# Patient Record
Sex: MALE | Race: WHITE | NOT HISPANIC OR LATINO | Employment: FULL TIME | URBAN - METROPOLITAN AREA
[De-identification: names, ages, dates, MRNs, and addresses within clinical notes are randomized per-mention and may not be internally consistent; named-entity substitution may affect disease eponyms.]

---

## 2017-01-17 ENCOUNTER — COMMUNICATION - HEALTHEAST (OUTPATIENT)
Dept: INTERNAL MEDICINE | Facility: CLINIC | Age: 42
End: 2017-01-17

## 2017-02-02 ENCOUNTER — OFFICE VISIT - HEALTHEAST (OUTPATIENT)
Dept: INTERNAL MEDICINE | Facility: CLINIC | Age: 42
End: 2017-02-02

## 2017-02-02 DIAGNOSIS — K21.9 GERD (GASTROESOPHAGEAL REFLUX DISEASE): ICD-10-CM

## 2017-02-15 ENCOUNTER — COMMUNICATION - HEALTHEAST (OUTPATIENT)
Dept: INTERNAL MEDICINE | Facility: CLINIC | Age: 42
End: 2017-02-15

## 2017-12-26 ENCOUNTER — COMMUNICATION - HEALTHEAST (OUTPATIENT)
Dept: INTERNAL MEDICINE | Facility: CLINIC | Age: 42
End: 2017-12-26

## 2017-12-26 ENCOUNTER — OFFICE VISIT - HEALTHEAST (OUTPATIENT)
Dept: INTERNAL MEDICINE | Facility: CLINIC | Age: 42
End: 2017-12-26

## 2017-12-26 DIAGNOSIS — R06.81 WITNESSED APNEIC SPELLS: ICD-10-CM

## 2017-12-26 DIAGNOSIS — Z80.9 FH: CANCER: ICD-10-CM

## 2017-12-26 DIAGNOSIS — Z12.5 SCREENING FOR PROSTATE CANCER: ICD-10-CM

## 2017-12-26 DIAGNOSIS — R06.83 SNORING: ICD-10-CM

## 2017-12-26 DIAGNOSIS — R53.83 FATIGUE: ICD-10-CM

## 2017-12-26 DIAGNOSIS — Z80.42 FH: PROSTATE CANCER: ICD-10-CM

## 2017-12-26 DIAGNOSIS — M25.561 RIGHT KNEE PAIN: ICD-10-CM

## 2017-12-26 DIAGNOSIS — Z23 NEED FOR INFLUENZA VACCINATION: ICD-10-CM

## 2017-12-26 DIAGNOSIS — Z13.220 LIPID SCREENING: ICD-10-CM

## 2017-12-26 DIAGNOSIS — Z78.9 NONSMOKER: ICD-10-CM

## 2017-12-26 DIAGNOSIS — Z00.00 ROUTINE PHYSICAL EXAMINATION: ICD-10-CM

## 2017-12-26 LAB
CHOLEST SERPL-MCNC: 261 MG/DL
FASTING STATUS PATIENT QL REPORTED: YES
HDLC SERPL-MCNC: 47 MG/DL
LDLC SERPL CALC-MCNC: 137 MG/DL
PSA SERPL-MCNC: 0.5 NG/ML (ref 0–2.5)
TRIGL SERPL-MCNC: 384 MG/DL

## 2017-12-26 ASSESSMENT — MIFFLIN-ST. JEOR: SCORE: 2022.98

## 2018-02-15 ENCOUNTER — COMMUNICATION - HEALTHEAST (OUTPATIENT)
Dept: INTERNAL MEDICINE | Facility: CLINIC | Age: 43
End: 2018-02-15

## 2018-02-15 DIAGNOSIS — K21.9 GERD (GASTROESOPHAGEAL REFLUX DISEASE): ICD-10-CM

## 2018-05-16 ENCOUNTER — COMMUNICATION - HEALTHEAST (OUTPATIENT)
Dept: INTERNAL MEDICINE | Facility: CLINIC | Age: 43
End: 2018-05-16

## 2018-05-16 DIAGNOSIS — K21.9 GERD (GASTROESOPHAGEAL REFLUX DISEASE): ICD-10-CM

## 2018-06-07 ENCOUNTER — OFFICE VISIT - HEALTHEAST (OUTPATIENT)
Dept: INTERNAL MEDICINE | Facility: CLINIC | Age: 43
End: 2018-06-07

## 2018-06-07 DIAGNOSIS — R07.0 THROAT PAIN: ICD-10-CM

## 2018-06-20 ENCOUNTER — OFFICE VISIT - HEALTHEAST (OUTPATIENT)
Dept: OTOLARYNGOLOGY | Facility: CLINIC | Age: 43
End: 2018-06-20

## 2018-06-20 DIAGNOSIS — R07.0 THROAT PAIN: ICD-10-CM

## 2018-06-20 DIAGNOSIS — K21.9 GASTROESOPHAGEAL REFLUX DISEASE, ESOPHAGITIS PRESENCE NOT SPECIFIED: ICD-10-CM

## 2018-10-14 ENCOUNTER — COMMUNICATION - HEALTHEAST (OUTPATIENT)
Dept: INTERNAL MEDICINE | Facility: CLINIC | Age: 43
End: 2018-10-14

## 2018-11-16 ENCOUNTER — COMMUNICATION - HEALTHEAST (OUTPATIENT)
Dept: INTERNAL MEDICINE | Facility: CLINIC | Age: 43
End: 2018-11-16

## 2018-11-16 DIAGNOSIS — K21.9 GERD (GASTROESOPHAGEAL REFLUX DISEASE): ICD-10-CM

## 2018-12-03 ENCOUNTER — COMMUNICATION - HEALTHEAST (OUTPATIENT)
Dept: INTERNAL MEDICINE | Facility: CLINIC | Age: 43
End: 2018-12-03

## 2018-12-03 DIAGNOSIS — K21.9 GERD (GASTROESOPHAGEAL REFLUX DISEASE): ICD-10-CM

## 2019-03-02 ENCOUNTER — COMMUNICATION - HEALTHEAST (OUTPATIENT)
Dept: INTERNAL MEDICINE | Facility: CLINIC | Age: 44
End: 2019-03-02

## 2019-03-02 DIAGNOSIS — K21.9 GERD (GASTROESOPHAGEAL REFLUX DISEASE): ICD-10-CM

## 2019-05-28 ENCOUNTER — COMMUNICATION - HEALTHEAST (OUTPATIENT)
Dept: INTERNAL MEDICINE | Facility: CLINIC | Age: 44
End: 2019-05-28

## 2019-05-28 DIAGNOSIS — K21.9 GERD (GASTROESOPHAGEAL REFLUX DISEASE): ICD-10-CM

## 2019-08-30 ENCOUNTER — COMMUNICATION - HEALTHEAST (OUTPATIENT)
Dept: INTERNAL MEDICINE | Facility: CLINIC | Age: 44
End: 2019-08-30

## 2019-08-30 DIAGNOSIS — K21.9 GERD (GASTROESOPHAGEAL REFLUX DISEASE): ICD-10-CM

## 2019-10-06 ENCOUNTER — COMMUNICATION - HEALTHEAST (OUTPATIENT)
Dept: INTERNAL MEDICINE | Facility: CLINIC | Age: 44
End: 2019-10-06

## 2019-10-06 DIAGNOSIS — K21.9 GERD (GASTROESOPHAGEAL REFLUX DISEASE): ICD-10-CM

## 2019-11-24 ENCOUNTER — COMMUNICATION - HEALTHEAST (OUTPATIENT)
Dept: INTERNAL MEDICINE | Facility: CLINIC | Age: 44
End: 2019-11-24

## 2019-11-24 DIAGNOSIS — K21.9 GERD (GASTROESOPHAGEAL REFLUX DISEASE): ICD-10-CM

## 2019-12-02 ENCOUNTER — COMMUNICATION - HEALTHEAST (OUTPATIENT)
Dept: INTERNAL MEDICINE | Facility: CLINIC | Age: 44
End: 2019-12-02

## 2019-12-02 DIAGNOSIS — K21.9 GERD (GASTROESOPHAGEAL REFLUX DISEASE): ICD-10-CM

## 2019-12-11 ENCOUNTER — COMMUNICATION - HEALTHEAST (OUTPATIENT)
Dept: INTERNAL MEDICINE | Facility: CLINIC | Age: 44
End: 2019-12-11

## 2019-12-11 DIAGNOSIS — K21.9 GERD (GASTROESOPHAGEAL REFLUX DISEASE): ICD-10-CM

## 2020-01-20 ENCOUNTER — OFFICE VISIT - HEALTHEAST (OUTPATIENT)
Dept: INTERNAL MEDICINE | Facility: CLINIC | Age: 45
End: 2020-01-20

## 2020-01-20 ENCOUNTER — COMMUNICATION - HEALTHEAST (OUTPATIENT)
Dept: INTERNAL MEDICINE | Facility: CLINIC | Age: 45
End: 2020-01-20

## 2020-01-20 DIAGNOSIS — K21.9 GASTROESOPHAGEAL REFLUX DISEASE, ESOPHAGITIS PRESENCE NOT SPECIFIED: ICD-10-CM

## 2020-01-20 DIAGNOSIS — H54.40 BLINDNESS OF LEFT EYE WITH NORMAL VISION IN CONTRALATERAL EYE: ICD-10-CM

## 2020-01-20 DIAGNOSIS — R06.81 APNEIC SPELL: ICD-10-CM

## 2020-01-20 DIAGNOSIS — Z00.00 ROUTINE PHYSICAL EXAMINATION: ICD-10-CM

## 2020-01-20 DIAGNOSIS — R06.83 SNORING: ICD-10-CM

## 2020-01-20 DIAGNOSIS — E78.2 MIXED HYPERLIPIDEMIA: ICD-10-CM

## 2020-01-20 LAB
CHOLEST SERPL-MCNC: 277 MG/DL
ERYTHROCYTE [DISTWIDTH] IN BLOOD BY AUTOMATED COUNT: 11.7 % (ref 11–14.5)
FASTING STATUS PATIENT QL REPORTED: YES
HCT VFR BLD AUTO: 49.1 % (ref 40–54)
HDLC SERPL-MCNC: 57 MG/DL
HGB BLD-MCNC: 16.2 G/DL (ref 14–18)
LDLC SERPL CALC-MCNC: 205 MG/DL
MCH RBC QN AUTO: 28.1 PG (ref 27–34)
MCHC RBC AUTO-ENTMCNC: 33 G/DL (ref 32–36)
MCV RBC AUTO: 85 FL (ref 80–100)
PLATELET # BLD AUTO: 241 THOU/UL (ref 140–440)
PMV BLD AUTO: 7.9 FL (ref 7–10)
RBC # BLD AUTO: 5.78 MILL/UL (ref 4.4–6.2)
TRIGL SERPL-MCNC: 77 MG/DL
WBC: 5.8 THOU/UL (ref 4–11)

## 2020-01-20 ASSESSMENT — MIFFLIN-ST. JEOR: SCORE: 2046.45

## 2020-01-21 ENCOUNTER — COMMUNICATION - HEALTHEAST (OUTPATIENT)
Dept: INTERNAL MEDICINE | Facility: CLINIC | Age: 45
End: 2020-01-21

## 2020-01-21 LAB — TSH SERPL DL<=0.005 MIU/L-ACNC: 1.73 UIU/ML (ref 0.3–5)

## 2020-02-27 ENCOUNTER — COMMUNICATION - HEALTHEAST (OUTPATIENT)
Dept: SCHEDULING | Facility: CLINIC | Age: 45
End: 2020-02-27

## 2020-12-06 ENCOUNTER — COMMUNICATION - HEALTHEAST (OUTPATIENT)
Dept: INTERNAL MEDICINE | Facility: CLINIC | Age: 45
End: 2020-12-06

## 2020-12-13 ENCOUNTER — AMBULATORY - HEALTHEAST (OUTPATIENT)
Dept: NURSING | Facility: CLINIC | Age: 45
End: 2020-12-13

## 2020-12-14 ENCOUNTER — COMMUNICATION - HEALTHEAST (OUTPATIENT)
Dept: SCHEDULING | Facility: CLINIC | Age: 45
End: 2020-12-14

## 2021-01-05 ENCOUNTER — OFFICE VISIT - HEALTHEAST (OUTPATIENT)
Dept: INTERNAL MEDICINE | Facility: CLINIC | Age: 46
End: 2021-01-05

## 2021-01-05 ENCOUNTER — COMMUNICATION - HEALTHEAST (OUTPATIENT)
Dept: INTERNAL MEDICINE | Facility: CLINIC | Age: 46
End: 2021-01-05

## 2021-01-05 DIAGNOSIS — R06.83 SNORING: ICD-10-CM

## 2021-01-05 DIAGNOSIS — K21.9 GASTROESOPHAGEAL REFLUX DISEASE, UNSPECIFIED WHETHER ESOPHAGITIS PRESENT: ICD-10-CM

## 2021-01-05 DIAGNOSIS — R06.81 APNEIC SPELL: ICD-10-CM

## 2021-01-20 ENCOUNTER — COMMUNICATION - HEALTHEAST (OUTPATIENT)
Dept: ADMINISTRATIVE | Facility: CLINIC | Age: 46
End: 2021-01-20

## 2021-01-20 DIAGNOSIS — Z84.81 FH: BRCA GENE POSITIVE: ICD-10-CM

## 2021-01-20 DIAGNOSIS — Z80.3 FH: BREAST CANCER: ICD-10-CM

## 2021-01-25 ENCOUNTER — RECORDS - HEALTHEAST (OUTPATIENT)
Dept: ADMINISTRATIVE | Facility: OTHER | Age: 46
End: 2021-01-25

## 2021-01-26 ENCOUNTER — RECORDS - HEALTHEAST (OUTPATIENT)
Dept: ADMINISTRATIVE | Facility: OTHER | Age: 46
End: 2021-01-26

## 2021-02-15 ENCOUNTER — RECORDS - HEALTHEAST (OUTPATIENT)
Dept: ADMINISTRATIVE | Facility: OTHER | Age: 46
End: 2021-02-15

## 2021-03-11 ENCOUNTER — RECORDS - HEALTHEAST (OUTPATIENT)
Dept: ADMINISTRATIVE | Facility: OTHER | Age: 46
End: 2021-03-11

## 2021-03-25 ENCOUNTER — RECORDS - HEALTHEAST (OUTPATIENT)
Dept: ADMINISTRATIVE | Facility: OTHER | Age: 46
End: 2021-03-25

## 2021-04-12 ENCOUNTER — RECORDS - HEALTHEAST (OUTPATIENT)
Dept: ADMINISTRATIVE | Facility: OTHER | Age: 46
End: 2021-04-12

## 2021-05-30 VITALS — WEIGHT: 224 LBS

## 2021-05-31 VITALS — HEIGHT: 77 IN | BODY MASS INDEX: 26.44 KG/M2 | WEIGHT: 223.9 LBS

## 2021-06-01 VITALS — WEIGHT: 226.6 LBS | BODY MASS INDEX: 26.87 KG/M2

## 2021-06-03 ENCOUNTER — RECORDS - HEALTHEAST (OUTPATIENT)
Dept: ADMINISTRATIVE | Facility: CLINIC | Age: 46
End: 2021-06-03

## 2021-06-04 VITALS
HEART RATE: 81 BPM | OXYGEN SATURATION: 96 % | BODY MASS INDEX: 26.94 KG/M2 | SYSTOLIC BLOOD PRESSURE: 112 MMHG | HEIGHT: 77 IN | DIASTOLIC BLOOD PRESSURE: 70 MMHG | WEIGHT: 228.2 LBS

## 2021-06-04 NOTE — TELEPHONE ENCOUNTER
Last Office Visit  12/26/2017 Sterling Drummond MD  Notes:  Patient comes in today for physical and for other issues.     He has been having increasing problems with apneic spells.  He does snore quite a bit and quite loudly.  His wife would like him to go through sleep evaluation.  He does have a little bit of issues with fatigue.     We reviewed his reflux and pantoprazole use.  This is going well for him.       Last Filled: 8/30/19  Next OV:  1/20/2020 Stanislaw Luo MD    Annual Physical on 1/20/20, and would like to request medication refills until he sees his PCP    Medication teed up for provider signature

## 2021-06-04 NOTE — TELEPHONE ENCOUNTER
Patient was not able to get his medication refills until he called to schedule an appointment with his PCP, Dr Luo.  Patient is now scheduled for his Annual Physical on 1/20/20, and would like to request medication refills until he sees his PCP. Angelina VOSSR

## 2021-06-05 NOTE — PATIENT INSTRUCTIONS - HE
Purpose:  The purpose of the GERD diet is to relieve many of the symptoms of GERD including heartburn, coughing, difficulty swallowing, chest pain, excessive clearing of the throat, the feeling that food is stuck in your throat and a sour taste or burning sensation in the mouth. Avoiding digestive stress can often alleviate some of the more uncomfortable or painfulaspects of GERD.    Factors That May Contribute to GERD:    Pregnancy    Being Overweight    Alcohol Use    Smoking    Medications that delay stomach emptying or increase the backup of acid into the esophagus    Tips for Avoiding GERD Pain:    Avoid using tobacco products as they can further weaken the esophagus    Avoid alcohol    Avoid overeating and eating too quickly    Avoid lying down or going to bed immediately after eating - wait 3 hours before lying down    Elevate the head of the bed 6  to 8  by placing blocks under the bedposts or putting a foam wedge under the top part of the mattress to avoid reflux while sleeping    Eat a low fat diet    Maintain a healthy weight    Drink liquids between meals instead of with meals    Chew gum after meals to help neutralize stomach acid    Eat in a calm, relaxed place, sit down while you eat    Exercise at least 3-4 times each week    Wear loose fitting clothing    GERD & Diet:  Reactions to foods vary greatly between individuals. Removing some foods from the diet may improved GERD and what the on person can tolerate may not be the same as what other can tolerate. Try eliminating all of the following foods to see if symptoms improve. Then try adding them back in one by one to see if some or all are contributing to GERD.      Fatty or fried foods - oil, butter, mayonnaise, cream sauces, high fat meals, whole milk dairy products, salad dressing,  cream soups    Mint flavorings, peppermint, spearmint, peppermint oil, spearmint oil    Spicy foods such as chili and juarez    Chocolate    Citrus fruits and juices  (grapefruit, orange, lemon, lime)    Caffeinated beverages such as tea, coffee, soda    Desserts    Raw onion and garlic    Tomato-based foods such as spaghetti sauce, pizza, chili

## 2021-06-05 NOTE — PROGRESS NOTES
Wt Readings from Last 20 Encounters:   01/20/20 (!) 228 lb 3.2 oz (103.5 kg)   06/07/18 (!) 226 lb 9.6 oz (102.8 kg)   12/26/17 (!) 223 lb 14.4 oz (101.6 kg)   02/02/17 (!) 224 lb (101.6 kg)   05/09/16 220 lb 4.8 oz (99.9 kg)   01/08/16 (!) 226 lb (102.5 kg)   11/17/15 207 lb (93.9 kg)   02/27/15 221 lb 4.8 oz (100.4 kg)

## 2021-06-06 NOTE — TELEPHONE ENCOUNTER
This does not seem like influenza based on his symptoms, so tamiflu is unlikely to help him.    Let me know if there are other symptoms.    Stanislaw Luo MD  General Internal Medicine  [facility]  2/27/2020, 12:08 PM

## 2021-06-06 NOTE — TELEPHONE ENCOUNTER
Called to patient and relayed message. Patient stated understanding and will make an appointment if he feels he needs to be seen but will continue home care and try cold medicine.

## 2021-06-06 NOTE — TELEPHONE ENCOUNTER
Pt is calling in with cold symptoms he developed this morning. Pt reports cough started today, and throat is a little sore. Pt denies earache, difficulty breathing, fever, runny nose, or sneezing. Pt reports his whole family has had this for a while. Pt is not a high risk patient, but has a wedding this weekend and wants to know if he can get Tamiflu ordered.   Home care measures discussed, and per protocol pt should be able to care for this at home. Discussed use of humidified air, Neti pot, and increasing fluids. Pt would like something prescribed because he has a wedding this weekend.   Please call Angelo at [phone] if you think Tamiflu is indicated.     Provider please advise.    Del Mcfadden RN [facility] Triage/Medication Refill    Reason for Disposition    Cough with no complications    Protocols used: COUGH-A-OH

## 2021-06-13 NOTE — TELEPHONE ENCOUNTER
Medication Question or Clarification  Who is calling: Wife  What medication are you calling about (include dose and sig)?:   pantoprazole (PROTONIX) 40 MG tablet 150 tablet 3 1/20/2020 3/20/2021    Sig - Route: Take 1 tablet (40 mg total) by mouth 2 (two) times a day for 60 days, THEN 1 tablet (40 mg total) daily. - Oral    Sent to pharmacy as: pantoprazole 40 mg tablet,delayed release (PROTONIX)    E-Prescribing Status: Receipt confirmed by pharmacy (1/20/2020  9:33 AM CST)        Who prescribed the medication?: Stanislaw Luo MD    What is your question/concern?: This medication does not seem to be working any longer for the patient.  Asking if there is an alternative medication that he can trail now.  The j patient is making an appointment, as he is due.  Please advise.  [id number]  Okay to leave a detailed message?: Yes

## 2021-06-13 NOTE — TELEPHONE ENCOUNTER
He would need to be seen to make a determination of this.  This is already a strong medication.    He could take it twice a day again if needed.    He would need to be seen before January 22nd (current appointment) for this if he wishes to address further.     He could also see gastroenterology for this but we could determine this at the time of his visit.    Stanislaw Luo MD  General Internal Medicine  [facility]  12/14/2020, 4:55 PM

## 2021-06-14 NOTE — TELEPHONE ENCOUNTER
Pt Mother was just diagnosed with Breast Cancer she has the BRCA gene and pt has been advised that he and his daughters should be tested.    Patient would like to know how to move forward.  Does he need an appt with PCP or can he just have an order to go to the Breast clinic.    Please call Pt and advise how to proceed.

## 2021-06-14 NOTE — TELEPHONE ENCOUNTER
PA APPROVED:    Approval start date: 01/05/2021  Approval end date:  01/05/2022    Pharmacy has been notified of approval and will contact patient when medication is ready for pickup.

## 2021-06-14 NOTE — TELEPHONE ENCOUNTER
He should schedule an appointment with a genetic counselor and then get testing for BRCA mutation.    I placed a referral.    He would call [phone] to schedule an appointment.  This might be able to be done via virtual visit with subsequent blood testing.    Stanislaw Luo MD  General Internal Medicine  [facility]  1/20/2021, 2:02 PM

## 2021-06-14 NOTE — PATIENT INSTRUCTIONS - HE
"To schedule your visit with the sleep specialist, please call [facility] at [phone].    Dr. Cameron Serna  [facility]  [address], [address]  [place] [address]    ______________________________________________________________________     To schedule your consultation with the gastroenterologist, please contact [facility] at [phone].    There are multiple sites across the [facility].  The sites on the [place] side of [facility] include:    Critical access hospital  [address] [place] [address]    [facility]  [address]   [place], [place] [address]     [place] [facility]  [address], [address]  [place] [address]    [facility]  [address], [address]  [place] [address]      ______________________________________________________________________     We will cancel the January appointment and follow up with me sometime in the next 6 months.    ______________________________________________________________________      You are due or coming due for your next tetanus vaccine.  If you are on Medicare, please check to see if your supplemental insurance covers this vaccine, as Medicare traditionally does not pay for this.    Here is some more information related to tetanus and why we recommend this vaccine:    Patient education: Tetanus (The Basics)    Written by the doctors and editors at UpToDate    What is tetanus? -- Tetanus is a serious infection that causes muscle stiffness and spasms. It is sometimes called \"lockjaw\" because muscle spasms can clench the jaw shut.    Tetanus is caused by bacteria (germs) that live in the soil. They can get into the body through a cut or scrape. They can also get into the body if a person uses a needle to inject illegal drugs. Most people in the [place] are protected from these bacteria because they have gotten vaccines against them.    What are the symptoms of " "tetanus? -- The symptoms include:    ?Stiff jaw or neck muscles, which make it hard to move your jaw or neck normally  ?Strange-looking smile that does not go away when you try to relax your mouth  ?Tight, painful muscles that do not let go when you try to relax them  ?Trouble breathing, swallowing, or both  ?Feeling irritable or restless  ?Sweating even when you are not exercising or hot  ?Heartbeat that is faster than usual, or irregular heartbeat  ?Fever  ?Painful muscle spasms    People who are very sick with tetanus can have muscle spasms that force the body into a \"bridge\" position. They might have:    ?Clenched fists  ?Back arched off the floor or bed  ?Legs stretched out  ?Arms moving back and forth  ?Trouble breathing - They might even stop breathing during a muscle spasm.    Should I see a doctor or nurse? -- See your doctor or nurse right away if:    ?You get a puncture wound, for example from a nail that goes through your skin.  ?You get a cut, scrape, or other injury you cannot clean completely.  ?You have an injury that leaves something (like a nail or glass) inside your body.  ?An animal bites you.  ?You have diabetes, and get a sore on your foot, leg, or other place.  ?You have a stiff jaw or neck, other tight muscles you cannot relax, or painful muscle spasms.  ?You have trouble breathing or swallowing.    It is especially important to see a doctor or nurse if you get a puncture wound or animal bite and your last tetanus shot was 5 years ago or longer, or if you do not remember getting a tetanus shot.    Is there a test for tetanus? -- No. There is no simple test. But your doctor or nurse should be able to tell if you have it by learning about your symptoms and vaccine history, and by doing an exam. This infection is most likely in people who have had an injury and who have not had the tetanus vaccine at all or not had the right vaccine boosters.    Is tetanus dangerous? -- Yes. People with tetanus " need to go to the hospital, and some people even die from it. The muscle spasms can stop your breathing.    How is tetanus treated? -- Doctors treat tetanus in the hospital, sometimes in the intensive care unit (ICU). Treatments include:    ?Cleaning cuts or scrapes to remove skin and tissue that could have tetanus bacteria on it  ?Giving medicines to fight the infection  ?Giving a tetanus vaccine booster  ?Giving medicine and other treatments to reduce muscle spasms, breathing problems, pain, and other symptoms  ?Using a ventilator (breathing machine) if you have trouble breathing on your own  ?Using a feeding tube if you cannot eat or drink on your own  ?Having physical therapy to help muscles recover    Can tetanus be prevented? -- Yes. To reduce your chances of getting tetanus, do these things:    ?Get a tetanus vaccine. This teaches your body how to fight tetanus. Most children growing up in the [place] get this vaccine as part of their routine childhood vaccines.  ?Get regular tetanus booster shots. Adults should get tetanus booster shots every 10 years. For bad wounds, you will need to get a tetanus booster shot if you haven't had one in the last 5 years. If you have a bad wound and you haven't received all of your tetanus vaccines or you are not sure if you have, you will need a tetanus booster shot and another shot to fight any tetanus bacteria that got in the wound.  ?Wash cuts or scrapes with soap and water and use antibiotic ointment on them. See a doctor or nurse if you cannot get all the dirt out or cannot see all the way into the wound.  ?Do not inject illegal drugs, or at least use clean needles if you do inject drugs or anything else.           05-Oct-2017 15:43

## 2021-06-14 NOTE — TELEPHONE ENCOUNTER
Central PA team  [phone]  Pool: HE PA MED ([id number])          PA has been initiated.       PA form completed and faxed insurance via Cover My Meds     Key:   [id number]     Medication:  Pantoprazole Sodium 40MG dr tablets    Insurance:  [employer] [place]        Response will be received via fax and may take up to 5-10 business days depending on plan

## 2021-06-18 NOTE — PROGRESS NOTES
"HPI: This patient is a 41 yo who presents for evaluation of the throat at the request of Dr. Drummond.  A few weeks ago patient heard a \"pop\" in his throat when he was eating.  Patient denied choking or dysphagia.  He then had lingering pain around his throat.  He took Ibuprofen for a few days which helped, but once he stopped that the pain resumed.  He does have acid reflux for which he takes reflux medication. He also notes more breakthrough heartburn. Patient drinks 12 cups of coffee a day. Denies fevers, otalgia, weight loss, odynophagia, dysphagia, hemoptysis, and shortness of breath.     Past medical history, surgical history, social history, family history, medications, and allergies have been reviewed with the patient and are documented above.    Review of Systems: a 10-system review was performed. Pertinent positives are noted in the HPI and on a separate scanned document in the chart.    PHYSICAL EXAMINATION:  GEN: no acute distress, normocephalic  EYES: extraocular movements are intact, pupils are equal and round. Sclera clear.   EARS: auricles are normally formed. The external auditory canals are clear with minimal to no cerumen. Tympanic membranes are intact bilaterally with no signs of infection, effusion, retractions, or perforations.  NOSE: anterior nares are patent. There are no masses or lesions. The septum is non-obstructing.  OC/OP: clear, dentition is in good repair. The tongue and palate are fully mobile and symmetric. The floor of mouth, base of tongue, and tonsils are soft and symmetric. Cobblestoning of the posterior pharyngeal wall.  HP/L (scope): nasopharynx, base of tongue, vallecula, epiglottis, and pyriform sinuses are clear. The bilateral vocal folds are mobile and without lesion. There is interarytenoid edema and erythema.  NECK: soft and supple. No lymphadenopathy or masses. Airway is midline.  NEURO: CN VII symmetric and strong. alert and oriented. No nystagmus. Gait is " normal.  PULM: breathing comfortably on room air, normal chest expansion with respiration    MEDICAL DECISION-MAKING: This patient is a 41 yo with chronic laryngitis/globus sensation from acid reflux. Discussed diet and lifestyle changes, including weight loss, in addition to risks and benefits of H2 blocker vs PPI therapy.  We also discussed that the sensation of his throat popping was likely from a muscle spasm.  Recent CT of the neck was negative and there were no masses or lesions visible with the scope today.  Patient with good understanding and may return to clinic PRN. Patient seen in tandem with Jess Carpio PA-C.

## 2021-06-18 NOTE — PROGRESS NOTES
"[facility] Note  Patient Name: Angelo Lino  Patient Age: 42 y.o.  YOB: 1975  MRN: [id number]  ?  Date of Visit: 6/7/2018  Reason for Office Visit:   Chief Complaint   Patient presents with     Sore Throat     last week was on the outter isde. This morning he was drinking coffee and heard a pop in the middle of throat. He stated everytime he swallows his very painful     HPI: Angelo Lino 42 y.o. who presents to clinic for painful throat x 1 week with acute worsening today. Had mild sore throat last week, then this morning was eating fruit and heard a 'pop' in throat. Now every time he swallows he has pain. Never had this before. He is able to swallow, not getting food stuck. No horse voice. Tender around cricothyroid. \"I know something is not right\". No difficulty breathing. Does not feel like anything is lodged or lump in throat.     Review of Systems: As noted in HPI     Current Scheduled Meds:  Outpatient Encounter Prescriptions as of 6/7/2018   Medication Sig Dispense Refill     loratadine-pseudoephedrine (CLARITIN-D 24-HOUR)  mg per 24 hr tablet Take 1 tablet by mouth daily.       MULTIVITS-MINERALS/FA/LYCOPENE (ONE-A-DAY MEN'S MULTIVITAMIN ORAL) Take by mouth.       pantoprazole (PROTONIX) 40 MG tablet Take 1 tablet (40 mg total) by mouth daily. 90 tablet 1     CHLORPHENIR/PHENYLEPH/ASPIRIN (BOY-SELTZER PLUS COLD, PE, ORAL) Take by mouth.       No facility-administered encounter medications on file as of 6/7/2018.        Objective / Physical Examination:  /70 (Patient Site: Right Arm, Patient Position: Sitting, Cuff Size: Adult Regular)  Pulse 69  Temp 98.3  F (36.8  C) (Oral)   Wt (!) 226 lb 9.6 oz (102.8 kg)  BMI 26.87 kg/m2  Wt Readings from Last 3 Encounters:   06/07/18 (!) 226 lb 9.6 oz (102.8 kg)   12/26/17 (!) 223 lb 14.4 oz (101.6 kg)   02/02/17 (!) 224 lb (101.6 kg)     Body mass index is 26.87 kg/(m^2). (>25?)    General Appearance: Alert and " oriented in no acute distress  Throat: Lips and mucosa moist. pharynx without erythema or exudate  Neck: full ROM. No midline cervical tenderness. He does endorse tenderness around cricothryoid, no masses, no thyromegaly. trachea midline. No cervical adenopathy. .  Integumentary: Warm and dry.  Neuro: Alert and oriented, follows commands appropriately. Grossly normal.    Assessment / Plan / Medical Decision Making:      Encounter Diagnoses   Name Primary?     Throat pain Yes        1. Throat pain    Cricothyroid injury? We did an xray and it was normal other than some calcification of the thyroid cartilage.  He is able to swallow, no foreign bodies. No breathing difficulties. Recommend ibuprofen three times a day for the next 3 days and watch closely.   If not improving or symptoms worsen, may warrant a CT and or referral to ENT  He agrees with plan and will let us know    - XR Neck Soft Tissue; Future  - XR Neck Soft Tissue    Total time spent with patient was 15 minutes with >50% of time spent in face-to-face counseling regarding the above plan     Sterling Drummond MD  [facility]

## 2021-06-20 NOTE — LETTER
Letter by Stanislaw Luo MD at      Author: Stanislaw Luo MD Service: -- Author Type: --    Filed:  Encounter Date: 1/21/2020 Status: Signed         1/21/2020    Angelo Lino   [address] [facility] [address]         Dear Angelo,    It was good to see you in clinic.  I hope your questions were answered at the time of your visit.    The results of your tests from your visit were as follows:    Resulted Orders   HM2(CBC w/o Differential)   Result Value Ref Range    WBC 5.8 4.0 - 11.0 thou/uL    RBC 5.78 4.40 - 6.20 mill/uL    Hemoglobin 16.2 14.0 - 18.0 g/dL    Hematocrit 49.1 40.0 - 54.0 %    MCV 85 80 - 100 fL    MCH 28.1 27.0 - 34.0 pg    MCHC 33.0 32.0 - 36.0 g/dL    RDW 11.7 11.0 - 14.5 %    Platelets 241 140 - 440 thou/uL    MPV 7.9 7.0 - 10.0 fL   Lipid Cascade RANDOM   Result Value Ref Range    Cholesterol 277 (H) <=199 mg/dL    Triglycerides 77 <=149 mg/dL    HDL Cholesterol 57 >=40 mg/dL    LDL Calculated 205 (H) <=129 mg/dL    Patient Fasting > 8hrs? Yes    Thyroid Stimulating Hormone (TSH)   Result Value Ref Range    TSH 1.73 0.30 - 5.00 uIU/mL     Your blood counts are normal and you are not anemic.     Your thyroid tests are excellent.     Your cholesterol is higher than in the past.  However, you are not a high risk for heart attack or stroke.  Please work on keeping active and keeping your weight down at this time.  We will continue to assess this.    Please follow up again in 1 year, sooner if issues.    If you have any questions regarding these results, please feel free to contact me at [phone].  I wish you the best of health!        Sincerely,      Stanislaw Luo MD  General Internal Medicine  [facility]

## 2021-06-21 NOTE — LETTER
Letter by Stanislaw Luo MD at      Author: Stanislaw Luo MD Service: -- Author Type: --    Filed:  Encounter Date: 12/6/2020 Status: (Other)         Angelo Lino   [address] [facility] [address]         Dear Angelo,    I am sending you this letter to remind you that you are due for a follow up visit in January or February.     Please call to schedule this visit as soon as possible as our schedule fills up quickly.    If you already have an appointment scheduled with me for around this time, please ignore this notification.    I look forward to seeing you to review your overall health.         Sincerely,       Stanislaw Luo MD  General Internal Medicine  [facility]

## 2021-06-24 NOTE — TELEPHONE ENCOUNTER
Refill Approved    Rx renewed per Medication Renewal Policy. Medication was last renewed on 12/3/2018.   Last office visit: 6/7/2018 with Dr TACHO Drummond. .    Deonna Duarte, [facility] Triage/Med Refill 3/2/2019     Requested Prescriptions   Pending Prescriptions Disp Refills     pantoprazole (PROTONIX) 40 MG tablet [Pharmacy Med Name: PANTOPRAZOLE 40MG TABLETS] 90 tablet 0     Sig: TAKE 1 TABLET BY MOUTH DAILY    GI Medications Refill Protocol Passed - 3/2/2019  3:32 AM       Passed - PCP or prescribing provider visit in last 12 or next 3 months.    Last office visit with prescriber/PCP: 1/8/2016 Stanislaw Luo MD OR same dept: 6/7/2018 Sterling Drummond MD OR same specialty: 6/7/2018 Sterling Drummond MD  Last physical: 12/26/2017 Last MTM visit: Visit date not found   Next visit within 3 mo: Visit date not found  Next physical within 3 mo: Visit date not found  Prescriber OR PCP: Stanislaw Luo MD  Last diagnosis associated with med order: 1. GERD (gastroesophageal reflux disease)  - pantoprazole (PROTONIX) 40 MG tablet [Pharmacy Med Name: PANTOPRAZOLE 40MG TABLETS]; TAKE 1 TABLET BY MOUTH DAILY  Dispense: 90 tablet; Refill: 0    If protocol passes may refill for 12 months if within 3 months of last provider visit (or a total of 15 months).

## 2021-06-25 NOTE — PROGRESS NOTES
Progress Notes by Louie Artis at 2/2/2017  4:20 PM     Author: Louie Artis Service: -- Author Type: Nurse Practitioner    Filed: 2/20/2017  8:58 AM Encounter Date: 2/2/2017 Status: Signed    : [facility]    Date of Service: 2/2/2017  Primary Provider: Stanislaw Luo MD    Patient Care Team:  Stanislaw Luo MD as PCP - General (Internal Medicine)     ______________________________________________________________________     Patient's Pharmacy:    [id number] - [place], [place] [address] AT [address] [address]  Phone: [phone] Fax: [phone]     Patient's Insurance:    Payor: BLUE CROSS / Plan: BLUE CROSS COMP CARE / Product Type: PPO/POS/FFS /      ______________________________________________________________________     Angelo Lino is 41 y.o. male who comes in today for:    Chief Complaint   Patient presents with   ? Medication Refill     Was needing to be seenf or any further refills Per Dr. Luo.        Patient Active Problem List   Diagnosis   ? HLD (hyperlipidemia)   ? GERD (gastroesophageal reflux disease)   ? AR (allergic rhinitis)   ? Blindness of left eye     Current Outpatient Prescriptions   Medication Sig   ? CHLORPHENIR/PHENYLEPH/ASPIRIN (BOY-SELTZER PLUS COLD, PE, ORAL) Take by mouth.   ? loratadine-pseudoephedrine (CLARITIN-D 24-HOUR)  mg per 24 hr tablet Take 1 tablet by mouth daily.   ? MULTIVITS-MINERALS/FA/LYCOPENE (ONE-A-DAY MEN'S MULTIVITAMIN ORAL) Take by mouth.   ? pantoprazole (PROTONIX) 40 MG tablet Take 1 tablet (40 mg total) by mouth daily.     Past Medical History:   Diagnosis Date   ? AR (allergic rhinitis)    ? Blindness of left eye    ? GERD (gastroesophageal reflux disease)    ? HLD (hyperlipidemia)      No past surgical history on file.  No family history on file.  Social History     Social  History   ? Marital status:      Spouse name: N/A   ? Number of children: 1   ? Years of education: N/A     Occupational History   ? 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 Smoking status: Never Smoker   ? Smokeless tobacco: Never Used   ? Alcohol use No   ? Drug use: Not on file   ? Sexual activity: Not on file     Other Topics Concern   ? Not on file     Social History Narrative    , 1 daughter.     ______________________________________________________________________     History of present illness:  Angelo Lino is a 41 year-old male with a past medical history includes hyperlipidemia, GERD, allergic rhinitis, and blindness of the left eye.  He presents today in clinic for prescription renewal of his PPI Protonix.  He states that his esophageal reflux has been well controlled with only rare instances of symptoms in which she'll take an occasional Tums.  Otherwise, he has been healthy and has no other health concerns at this time.    On review of systems, the patient denies any headache, nasal or sinus congestion, chest pain, shortness of breath, abdominal pain, constipation or dysuria.  Positive for symptoms as noted in the HPI.    ______________________________________________________________________    Wt Readings from Last 3 Encounters:   02/02/17 (!) 224 lb (101.6 kg)   05/09/16 220 lb 4.8 oz (99.9 kg)   01/08/16 (!) 226 lb (102.5 kg)     BP Readings from Last 3 Encounters:   02/02/17 104/70   05/09/16 110/70   01/08/16 120/72     Visit Vitals   ? /70   ? Pulse 74   ? Wt (!) 224 lb (101.6 kg)        Physical Exam:  General Appearance: Alert, cooperative, no distress, appears stated age  Ears: Normal TM's and external ear canals, both ears  Nose: Nares normal, septum midline,mucosa normal, no drainage  Throat: Lips, mucosa, and tongue normal; teeth and gums normal; oropharynx is normal  Neck: Supple, symmetrical, trachea midline, no adenopathy;  thyroid: not enlarged,  symmetric, no tenderness/mass/nodules  Lungs: Clear to auscultation bilaterally, respirations unlabored  Heart: Regular rate and rhythm, S1 and S2 normal, no murmur, rub, or gallop  Abdomen: Soft, non-tender, bowel sounds active all four quadrants,  no masses, no organomegaly  Extremities: Extremities normal, atraumatic, no cyanosis or edema  Lymph nodes: Cervical, supraclavicular nodes normal  Neurologic: He is alert and oriented ×3. He has normal reflexes.   Psychiatric: He has a normal mood and affect.   ______________________________________________________________________    Assessment:    1. GERD (gastroesophageal reflux disease) - well controlled on pantoprazole.      ______________________________________________________________________        Plan:  1. Continue pantoprozole (Protonix) 40 mg by mouth daily - Rx sent to pharmacy with RF #11    Louie Artis, [facility]  Internal Medicine  [facility]     Return if symptoms worsen or fail to improve.

## 2021-06-26 NOTE — PROGRESS NOTES
Progress Notes by Stanislaw Luo MD at 12/26/2017  3:30 PM     Author: Stanislaw Luo MD Service: -- Author Type: Physician    Filed: 12/29/2017  7:45 AM Encounter Date: 12/26/2017 Status: Signed    : Stanislaw Luo MD (Physician)              [facility]    Comprehensive and complex medical care - Chronic disease management - Shared decision making - Care coordination - Compassionate care    Patient advocacy - Rational deprescribing - Minimally disruptive medicine - Ethical focus - Customized care    ______________________________________________________________________     Date of Service: 12/26/2017  Primary Provider: Stanislaw Luo MD    Patient Care Team:  Stanislaw Luo MD as PCP - General (Internal Medicine)     ______________________________________________________________________     Patient's Pharmacy:    [id number] - [place], [place] - [address] AT [facility] &  [address]  [address] [facility] [address]  Phone: [phone] Fax: [phone]     Patient's Contacts:  Name Home Phone Work Phone Mobile Phone Relationship Lglauren YUDAYRON MARTI [phone]   Spouse      Patient's Insurance:    Payor: BLUE CROSS / Plan: BLUE CROSS COMP CARE / Product Type: PPO/POS/FFS /       ______________________________________________________________________     Routine physical - male, age 27-49    Angelo Lino is a 42 y.o. male coming in today for a routine physical.  He does have other issues outside the physical to discuss as well.    Active Problem List:  Problem List as of 12/26/2017  Reviewed: 1/9/2016  9:58 PM by Stanislaw Luo MD       Medium    GERD (gastroesophageal reflux disease)       Low    HLD (hyperlipidemia)    AR (allergic rhinitis)    Blindness of left eye           Past Medical History:   Diagnosis Date   ? AR (allergic rhinitis)    ? Blindness of left eye    ? GERD (gastroesophageal reflux  disease)    ? HLD (hyperlipidemia)    ? Nonsmoker      No past surgical history on file.  Social History     Social History   ? Marital status:      Spouse name: N/A   ? Number of children: 1   ? Years of education: N/A     Occupational History   ? 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 Smoking status: Never Smoker   ? Smokeless tobacco: Never Used   ? Alcohol use No   ? Drug use: None   ? Sexual activity: Not Asked     Other Topics Concern   ? None     Social History Narrative    , 1 daughter.      Family History   Problem Relation Age of Onset   ? Multiple myeloma Father    ? Breast cancer Sister 45   ? No Medical Problems Mother      Family history is otherwise noncontributory.    Current Outpatient Prescriptions   Medication Sig   ? CHLORPHENIR/PHENYLEPH/ASPIRIN (BOY-SELTZER PLUS COLD, PE, ORAL) Take by mouth.   ? loratadine-pseudoephedrine (CLARITIN-D 24-HOUR)  mg per 24 hr tablet Take 1 tablet by mouth daily.   ? MULTIVITS-MINERALS/FA/LYCOPENE (ONE-A-DAY MEN'S MULTIVITAMIN ORAL) Take by mouth.   ? pantoprazole (PROTONIX) 40 MG tablet Take 1 tablet (40 mg total) by mouth daily.       Allergies: Review of patient's allergies indicates no known allergies.  Immunization History   Administered Date(s) Administered   ? DT (pediatric) 01/01/2002   ? Influenza, inj, historic,unspecified 12/13/2007   ? Influenza,seasonal quad, PF, 36+MOS 12/26/2017   ? Influenza,seasonal, Inj IIV3 12/13/2007, 09/18/2014   ? Td,adult,historic,unspecified 01/01/2002   ? Tdap 06/10/2010, 09/19/2011      ______________________________________________________________________     History of present illness:    Patient comes in today for physical and for other issues.    He has been having increasing problems with apneic spells.  He does snore quite a bit and quite loudly.  His wife would like him to go through sleep evaluation.  He does have a little bit of issues with fatigue.    We reviewed his reflux  "and pantoprazole use.  This is going well for him.    He is really concerned because of a family history of cancer.  His dad passed away this year of multiple myeloma at age 68.  His sister was diagnosed with breast cancer at age 45.  She is apparently undergoing extensive treatment.  She had testing for BRCA 1 and 2 which was negative.  He would like baseline PSA because his internal grandfather and maternal brother both had prostate cancer at an older age.  He has no other symptoms of cancer at this time.    He has had some right knee pain as well and this is on the inside of the knee along the MCL.  It is sporadic in appearance.  He has had no swelling with this.  We talked about x-ray, but he wants to wait on this at this time.    10 point review of systems are per the health history form.    ______________________________________________________________________     Exam:    Wt Readings from Last 3 Encounters:   12/26/17 (!) 223 lb 14.4 oz (101.6 kg)   02/02/17 (!) 224 lb (101.6 kg)   05/09/16 220 lb 4.8 oz (99.9 kg)     BP Readings from Last 3 Encounters:   12/26/17 126/82   02/02/17 104/70   05/09/16 110/70      /82  Pulse 88  Ht 6' 5\" (1.956 m)  Wt (!) 223 lb 14.4 oz (101.6 kg)  SpO2 95%  BMI 26.55 kg/m2    The patient is in no acute distress.  Mood good.  Insight good.  No skin lesions or nodules of concern.  Ears are clear.  Nose is clear.  Throat is clear.  Neck is supple  and there is no thyromegaly.  No carotid bruits.  No cervical, epitrochlear or axillary adenopathy.  Heart regular rate and rhythm.  No murmur present.  Lungs clear to auscultation bilaterally.  Respiratory effort is good.  Abdomen is soft, nontender.  No hepatosplenomegaly.  No hernia appreciated.  Extremities show no edema.  Neuro exam shows normal strength in all muscle groups and normal reflexes.  Testicular exam shows no abnormality.  Prostate is 2+ smooth and soft.  No rectal mass.  The right knee shows no effusion and " no meniscal signs.    ______________________________________________________________________    Diagnostics:    Results for orders placed or performed in visit on 12/26/17   PSA, Annual Screen (Prostatic-Specific Antigen)   Result Value Ref Range    PSA 0.5 0.0 - 2.5 ng/mL   Comprehensive Metabolic Panel   Result Value Ref Range    Sodium 141 136 - 145 mmol/L    Potassium 4.5 3.5 - 5.0 mmol/L    Chloride 104 98 - 107 mmol/L    CO2 27 22 - 31 mmol/L    Anion Gap, Calculation 10 5 - 18 mmol/L    Glucose 95 70 - 125 mg/dL    BUN 17 8 - 22 mg/dL    Creatinine 0.93 0.70 - 1.30 mg/dL    GFR MDRD Af Amer >60 >60 mL/min/1.73m2    GFR MDRD Non Af Amer >60 >60 mL/min/1.73m2    Bilirubin, Total 0.2 0.0 - 1.0 mg/dL    Calcium 9.5 8.5 - 10.5 mg/dL    Protein, Total 7.4 6.0 - 8.0 g/dL    Albumin 3.9 3.5 - 5.0 g/dL    Alkaline Phosphatase 68 45 - 120 U/L    AST 21 0 - 40 U/L    ALT 44 0 - 45 U/L   Lipid Cascade   Result Value Ref Range    Cholesterol 261 (H) <=199 mg/dL    Triglycerides 384 (H) <=149 mg/dL    HDL Cholesterol 47 >=40 mg/dL    LDL Calculated 137 (H) <=129 mg/dL    Patient Fasting > 8hrs? Yes    HM1 (CBC with Diff)   Result Value Ref Range    WBC 6.9 4.0 - 11.0 thou/uL    RBC 5.59 4.40 - 6.20 mill/uL    Hemoglobin 15.6 14.0 - 18.0 g/dL    Hematocrit 45.7 40.0 - 54.0 %    MCV 82 80 - 100 fL    MCH 28.0 27.0 - 34.0 pg    MCHC 34.2 32.0 - 36.0 g/dL    RDW 12.7 11.0 - 14.5 %    Platelets 206 140 - 440 thou/uL    MPV 8.1 7.0 - 10.0 fL    Neutrophils % 48 (L) 50 - 70 %    Lymphocytes % 35 20 - 40 %    Monocytes % 6 2 - 10 %    Eosinophils % 9 (H) 0 - 6 %    Basophils % 2 0 - 2 %    Neutrophils Absolute 3.3 2.0 - 7.7 thou/uL    Lymphocytes Absolute 2.4 0.8 - 4.4 thou/uL    Monocytes Absolute 0.4 0.0 - 0.9 thou/uL    Eosinophils Absolute 0.6 (H) 0.0 - 0.4 thou/uL    Basophils Absolute 0.1 0.0 - 0.2 thou/uL      ______________________________________________________________________    Assessment:    1. Routine physical  examination    2. Need for influenza vaccination    3. Witnessed apneic spells    4. Snoring    5. FH: prostate cancer    6. Screening for prostate cancer    7. Fatigue    8. Lipid screening    9. FH: cancer    10. Nonsmoker    11. Right knee pain         ______________________________________________________________________     ______________________________________________________________________    QUALITY REVIEW      There are no preventive care reminders to display for this patient.    History   Smoking Status   ? Never Smoker   Smokeless Tobacco   ? Never Used        PHQ-2 Total Score: 0 (12/26/2017  3:00 PM)  No Data Recorded     ______________________________________________________________________       Plan:    1. Blood work done today.  2. Continue pantoprazole at this time.  Next year come in for physical.  3. Sleep referral for sleep study.  4. Consider x-ray of the right knee if not improving.    Stanislaw Luo MD  General Internal Medicine  [facility]    Return in about 1 year (around 12/26/2018) for physical.     No future appointments.

## 2021-06-28 NOTE — PROGRESS NOTES
Progress Notes by Stanislaw Luo MD at 1/20/2020  8:25 AM     Author: Stanislaw Luo MD Service: -- Author Type: Physician    Filed: 1/20/2020  2:39 PM Encounter Date: 1/20/2020 Status: Signed    : Stanislaw Luo MD (Physician)              [facility] - [facility]    Comprehensive and complex medical care - Chronic disease management - Shared decision making - Care coordination - Compassionate care    Patient advocacy - Rational deprescribing - Minimally disruptive medicine - Ethical focus - Customized care          Date of Service: 1/20/2020  Primary Provider: Stanislaw Luo MD    Patient Care Team:  Stanislaw Luo MD as PCP - General (Internal Medicine)  Stanislaw Luo MD as Assigned PCP     ______________________________________________________________________     Patient's Pharmacy:    [id number] - [address] AT [facility] & [address]  [address] [place] [address]  Phone: [phone] Fax: [phone]     Patient's Contacts:  Name Home Phone Work Phone Mobile Phone Relationship Lgl DAYRON Davison [phone] Spouse      Patient's Insurance:    Payor: BLUE CROSS / Plan: BLUE CROSS [facility] / Product Type: PPO/POS/FFS /          Routine physical - male, age 27-49    Angelo Lino is a 44 y.o. male coming in today for a routine physical.  He does have other issues outside the physical to discuss as well.    Chief Complaint   Patient presents with   ? Annual Exam   ? Sleep Apnea     cpap        Active Problem List:  Problem List as of 1/20/2020 Reviewed: 12/29/2017  7:41 AM by Stanislaw Luo MD       High    Nonsmoker       Medium    GERD (gastroesophageal reflux disease)       Low    AR (allergic rhinitis)    Blindness of left eye    HLD (hyperlipidemia)           Past Medical History:   Diagnosis Date   ? AR (allergic rhinitis)    ? Blindness of left eye    ? GERD  (gastroesophageal reflux disease)    ? HLD (hyperlipidemia)    ? Nonsmoker      No past surgical history on file.  Social History     Occupational History   ? Occupation: [employer]     Employer: [employer]   Tobacco Use   ? Smoking status: Never Smoker   ? Smokeless tobacco: Never Used   Substance and Sexual Activity   ? Alcohol use: No   ? Drug use: Not on file   ? Sexual activity: Not on file      Social History     Social History Narrative    , 1 daughter.      Family History   Problem Relation Age of Onset   ? Multiple myeloma Father    ? Breast cancer Sister 45   ? No Medical Problems Mother      Family history is otherwise noncontributory.    Current Outpatient Medications   Medication Sig   ? CHLORPHENIR/PHENYLEPH/ASPIRIN (BOY-SELTZER PLUS COLD, PE, ORAL) Take by mouth.   ? loratadine-pseudoephedrine (CLARITIN-D 24-HOUR)  mg per 24 hr tablet Take 1 tablet by mouth daily.   ? MULTIVITS-MINERALS/FA/LYCOPENE (ONE-A-DAY MEN'S MULTIVITAMIN ORAL) Take by mouth.   ? pantoprazole (PROTONIX) 40 MG tablet Take 1 tablet (40 mg total) by mouth 2 (two) times a day for 60 days, THEN 1 tablet (40 mg total) daily.     Allergies: Patient has no known allergies.  Immunization History   Administered Date(s) Administered   ? DT (pediatric) 01/01/2002   ? Influenza, inj, historic,unspecified 12/13/2007, 11/01/2019   ? Influenza,seasonal quad, PF, =/> 6months 12/26/2017   ? Influenza,seasonal, Inj IIV3 12/13/2007, 09/18/2014   ? Td,adult,historic,unspecified 01/01/2002   ? Tdap 06/10/2010, 09/19/2011             Patient comes in today to be seen for a number of issues.  He is also in for physical.    We reviewed his acid reflux.  He has been off of pantoprazole for 1 month.  He has had worsening symptoms in the last year and a half.  He denies any recent episodes of catching in his throat.  He is had it more prominent in the last year to year and a half.  This has escalated since being off the pantoprazole.  He has  "been using Tums which helped.  Rolaids can also help.  He is used dietary modification without a whole lot of success.  He is wanting to get back on the pantoprazole.  He also discussed with me gastroenterology evaluation and possible other interventions, but he wants to hold on this at this time.    We reviewed his issues with his sleeping.  He has been snoring quite a bit and he does have apneic spells.  He has a morning headache.  He does note some fatigue during the day.  He would like to proceed with sleep evaluation.    We reviewed his hyperlipidemia and will be checking up on this.  He is not at a high risk for heart disease.    He declines HIV screening.    He does get some headaches at times likely related to clenching his teeth at night and possibly some migraines related to his left eye.    The 10-system review of systems and health history form for [facility] was completed by patient, reviewed by me, and pertinent problems are reviewed above.    Exam:     Wt Readings from Last 3 Encounters:   01/20/20 (!) 228 lb 3.2 oz (103.5 kg)   06/07/18 (!) 226 lb 9.6 oz (102.8 kg)   12/26/17 (!) 223 lb 14.4 oz (101.6 kg)     BP Readings from Last 3 Encounters:   01/20/20 112/70   06/07/18 110/70   12/26/17 126/82     /70   Pulse 81   Ht 6' 5.25\" (1.962 m)   Wt (!) 228 lb 3.2 oz (103.5 kg)   SpO2 96%   BMI 26.89 kg/m     The patient is in no acute distress.  Mood good.  Insight good.  No skin lesions or nodules of concern.  Ears are clear.  Left pupil is dilated due to visual loss.  Nose is clear.  Throat is clear.  Neck is supple  and there is no thyromegaly.  No carotid bruits.  No cervical, epitrochlear or axillary adenopathy.  Heart regular rate and rhythm.  No murmur present.  Lungs clear to auscultation bilaterally.  Respiratory effort is good.  Abdomen is soft, nontender.  No hepatosplenomegaly.  No hernia appreciated.  Extremities show no edema.  Neuro exam shows normal strength in all muscle " groups and normal reflexes.      Diagnostics:     Results for orders placed or performed in visit on 01/20/20   HM2(CBC w/o Differential)   Result Value Ref Range    WBC 5.8 4.0 - 11.0 thou/uL    RBC 5.78 4.40 - 6.20 mill/uL    Hemoglobin 16.2 14.0 - 18.0 g/dL    Hematocrit 49.1 40.0 - 54.0 %    MCV 85 80 - 100 fL    MCH 28.1 27.0 - 34.0 pg    MCHC 33.0 32.0 - 36.0 g/dL    RDW 11.7 11.0 - 14.5 %    Platelets 241 140 - 440 thou/uL    MPV 7.9 7.0 - 10.0 fL       Assessment:     1. Routine physical examination    2. Gastroesophageal reflux disease, esophagitis presence not specified    3. Mixed hyperlipidemia    4. Snoring    5. Apneic spell    6. Blindness of left eye with normal vision in contralateral eye        Quality review:     PHQ-2 Total Score: 0 (1/20/2020  8:39 AM)    No data recorded  ______________________________________________________________________     BMI Readings from Last 1 Encounters:   01/20/20 26.89 kg/m        Plan:     1. Blood work done today.  2. Pantoprazole 40 mg twice daily for 2 months, then reduce to once a day.  3. Given diet on GERD.  4. Consider GI referral in the future.  This could be done without seeing.  Would do consult rather than just straight EGD.  5. Referral to sleep medicine for evaluation of sleep apnea.    Stanislaw Luo MD  General Internal Medicine  [facility]    Return in about 1 year (around 1/20/2021).     No future appointments.      ______________________________________________________________________     Relevant ICD-10 codes and order associations:      ICD-10-CM    1. Routine physical examination Z00.00    2. Gastroesophageal reflux disease, esophagitis presence not specified K21.9 HM2(CBC w/o Differential)     pantoprazole (PROTONIX) 40 MG tablet     DISCONTINUED: pantoprazole (PROTONIX) 40 MG tablet   3. Mixed hyperlipidemia E78.2 Lipid Cascade RANDOM   4. Snoring R06.83 Ambulatory referral to Sleep Medicine   5. Apneic spell  R06.81 Ambulatory referral to Sleep Medicine   6. Blindness of left eye with normal vision in contralateral eye H54.40

## 2021-06-30 NOTE — PROGRESS NOTES
Progress Notes by Stanislaw Luo MD at 1/5/2021  8:15 AM     Author: Stanislaw Luo MD Service: -- Author Type: Physician    Filed: 1/5/2021 11:38 AM Encounter Date: 1/5/2021 Status: Signed    : Stanislaw Luo MD (Physician)       Angelo Lino is a 45 y.o. male who is being evaluated via a billable telephone visit.      What phone number would you like to be contacted at? [phone]  How would you like to obtain your AVS? AVS Preference: Mail a copy.           [facility]     TELEPHONE VISIT     Comprehensive and complex medical care - Chronic disease management - Shared decision making - Care coordination - Compassionate care    Patient advocacy - Rational deprescribing - Minimally disruptive medicine - Ethical focus - Customized care         Angelo Lino is a 45 y.o. male who is being evaluated via a billable telephone visit.           Active Problem List:  Problem List as of 1/5/2021 Reviewed: 1/5/2021 11:32 AM by Stanislaw Luo MD       High    Nonsmoker       Medium    GERD (gastroesophageal reflux disease)       Low    AR (allergic rhinitis)    Blindness of left eye    HLD (hyperlipidemia)           Current Outpatient Medications   Medication Sig   ? MULTIVITS-MINERALS/FA/LYCOPENE (ONE-A-DAY MEN'S MULTIVITAMIN ORAL) Take by mouth.   ? pantoprazole (PROTONIX) 40 MG tablet Take 1 tablet (40 mg total) by mouth 2 (two) times a day.   ? loratadine-pseudoephedrine (CLARITIN-D 24-HOUR)  mg per 24 hr tablet Take 1 tablet by mouth daily.       Subjective:     Angelo Lino presents with:      Chief Complaint   Patient presents with   ? Gastroesophageal Reflux     medication isn't helping much the past three months, only takes pantoprazole once a day most days      The patient has a phone visit today which is done in light of the current coronavirus outbreak.    Patient comes in today by phone to discuss his health.    We reviewed that he is due  for tetanus shot and he should get this at the next visit.    We reviewed his reflux disease.  He has dealt with this for a long time.  It sounds like he never doubled up on his dose last year when we advised him to do so.  It is been really horrible in the last 3 months.  He has tried to change his diet.  He has it daily.  He has acid in the back of his throat.  He denies any dysphagia.  He denies any blood in the stools.  His symptoms are predominantly in his chest and his throat and not so much in his abdomen.  He has never had an EGD.  He has not seen gastroenterology for this.  He would like to see them and we talked about other possible diagnoses that could be present.    We reviewed his apneic spells and snoring.  He has now gotten to the point where he wakes himself up.  His wife says it is very severe as well.  She has seen him stop breathing at night.  He was given a referral to sleep medicine last year but did not follow through.  We will renew this and he is ready to follow through.  He is hoping he can have a home sleep test, however.    We reviewed his other issues noted in the assessment but not specifically addressed in the HPI above.     Objective:     Limited phone exam reveals:  Patient in no distress.  Mood is good.  Insight is good.  No dyspnea.    Diagnostics:     Results for orders placed or performed in visit on 01/20/20   HM2(CBC w/o Differential)   Result Value Ref Range    WBC 5.8 4.0 - 11.0 thou/uL    RBC 5.78 4.40 - 6.20 mill/uL    Hemoglobin 16.2 14.0 - 18.0 g/dL    Hematocrit 49.1 40.0 - 54.0 %    MCV 85 80 - 100 fL    MCH 28.1 27.0 - 34.0 pg    MCHC 33.0 32.0 - 36.0 g/dL    RDW 11.7 11.0 - 14.5 %    Platelets 241 140 - 440 thou/uL    MPV 7.9 7.0 - 10.0 fL   Lipid Cascade RANDOM   Result Value Ref Range    Cholesterol 277 (H) <=199 mg/dL    Triglycerides 77 <=149 mg/dL    HDL Cholesterol 57 >=40 mg/dL    LDL Calculated 205 (H) <=129 mg/dL    Patient Fasting > 8hrs? Yes    Thyroid  Stimulating Hormone (TSH)   Result Value Ref Range    TSH 1.73 0.30 - 5.00 uIU/mL       Quality review:     PHQ-2 Total Score: 0 (1/5/2021  8:23 AM)    No data recorded  ______________________________________________________________________     BMI Readings from Last 1 Encounters:   01/20/20 26.89 kg/m        Assessment and Plan:     1. Apneic spell  Will refer to sleep medicine.  He would prefer to do a home sleep test if possible.  Pretest probability of obstructive sleep apnea (RENÉ) is very good.    - Ambulatory referral to Sleep Medicine - [place] [facility]    2. Snoring    - Ambulatory referral to Sleep Medicine - [place] [facility]    3. Gastroesophageal reflux disease, unspecified whether esophagitis present  Recalcitrant symptoms.  Will increase the pantoprazole (Protonix) to two times a day for the time being.  Discussed with patient and he would like opinion with gastroenterology and I also recommend this.  He will likely need upper endoscopy (EGD).    - pantoprazole (PROTONIX) 40 MG tablet; Take 1 tablet (40 mg total) by mouth 2 (two) times a day.  Dispense: 180 tablet; Refill: 3  - Ambulatory referral to Gastroenterology [facility]     Tetanus shot is due and he will do this at the next visit.  He will schedule a follow-up in the next 6 months.    Phone call duration:  19 minutes    Return in about 6 months (around 7/5/2021) for follow up visit.     No future appointments.         Stanislaw Luo MD  General Internal Medicine  [facility]    HCC issues resolved at this visit.

## 2021-07-03 NOTE — ADDENDUM NOTE
Addendum Note by Jesus Davidson MD at 1/20/2020  8:25 AM     Author: Jesus Davidson MD Service: -- Author Type: Physician    Filed: 1/20/2020 10:14 PM Encounter Date: 1/20/2020 Status: Signed    : Jesus Davidson MD (Physician)    Addended by: JESUS DAVIDSON on: 1/20/2020 10:14 PM        Modules accepted: Orders

## 2021-08-04 ENCOUNTER — TELEPHONE (OUTPATIENT)
Dept: INTERNAL MEDICINE | Facility: CLINIC | Age: 46
End: 2021-08-04

## 2021-08-04 DIAGNOSIS — M25.521 RIGHT ELBOW PAIN: Primary | ICD-10-CM

## 2021-08-04 NOTE — TELEPHONE ENCOUNTER
Please verify the issue and the elbow involved so that I can put it on the referral.    I recommend Dr. Mat Benavidez or Dr. Curtis Bhatt at [facility] for elbow issues.    To schedule an appointment with [facility], please call [phone].     Return to me to place referral.    Stanislaw Luo MD  General Internal Medicine  [facility]  8/4/2021, 10:15 AM

## 2021-08-04 NOTE — TELEPHONE ENCOUNTER
Last visit: 1/5/2021-Dr Stanislaw Luo    Patient requesting referral to ortho to have elbow looked at.    Pended order

## 2021-08-04 NOTE — TELEPHONE ENCOUNTER
Dr. Valerio Stephens is calling today asking for a referral to Orthopedics to get his elbow looked at. He can be reached at [phone] with any additional questions.

## 2021-08-04 NOTE — TELEPHONE ENCOUNTER
Called to patient. He states that it is his right elbow.    2 months ago on vacation, him and his kids were goofing around when this happened. He did not hear a pop but stated that something seems off feels as if the bone/ joint is busied, Unsure if its in the nerve. Has had tennis elbow before and knows this is not it.    Constantly sore. Times where it flares up when he cant even move it. Taking ibuprofen. Icing and both are not really effective.    Patient took down number as well as provider names and will give them a call

## 2021-08-05 ENCOUNTER — TRANSFERRED RECORDS (OUTPATIENT)
Dept: HEALTH INFORMATION MANAGEMENT | Facility: CLINIC | Age: 46
End: 2021-08-05

## 2021-11-06 ENCOUNTER — IMMUNIZATION (OUTPATIENT)
Dept: FAMILY MEDICINE | Facility: CLINIC | Age: 46
End: 2021-11-06
Payer: COMMERCIAL

## 2021-11-06 PROCEDURE — 90471 IMMUNIZATION ADMIN: CPT

## 2021-11-06 PROCEDURE — 90686 IIV4 VACC NO PRSV 0.5 ML IM: CPT

## 2022-01-04 DIAGNOSIS — K21.9 GASTROESOPHAGEAL REFLUX DISEASE, UNSPECIFIED WHETHER ESOPHAGITIS PRESENT: Primary | ICD-10-CM

## 2022-01-04 RX ORDER — PANTOPRAZOLE SODIUM 40 MG/1
40 TABLET, DELAYED RELEASE ORAL 2 TIMES DAILY
Qty: 120 TABLET | Refills: 0 | Status: SHIPPED | OUTPATIENT
Start: 2022-01-04 | End: 2022-04-14

## 2022-02-08 ENCOUNTER — TELEPHONE (OUTPATIENT)
Dept: INTERNAL MEDICINE | Facility: CLINIC | Age: 47
End: 2022-02-08
Payer: COMMERCIAL

## 2022-02-15 NOTE — TELEPHONE ENCOUNTER
Prior Authorization Approval    Authorization Effective Date: 2/8/2022  Authorization Expiration Date: 2/8/2023  Medication: pantoprazole   Approved Dose/Quantity: 2 tabs per day  Reference #: [id number]   Insurance Company: [employer] [place] - Phone [phone] Fax [phone]  Which Pharmacy is filling the prescription (Not needed for infusion/clinic administered): [id number] - [place], [place] - [facility] [address] AT [facility] [address]  Pharmacy Notified: Yes  Patient Notified: Yes

## 2022-02-15 NOTE — TELEPHONE ENCOUNTER
PA Initiation    Medication: pantoprazole   Insurance Company: [employer] [place] - Phone [phone] Fax [phone]  Pharmacy Filling the Rx: [id number] - [place] [address] [address] AT [facility] [address]  Filling Pharmacy Phone: [phone]  Filling Pharmacy Fax: [phone]  Start Date: 2/15/2022

## 2022-05-02 PROBLEM — K20.0 EOSINOPHILIC ESOPHAGITIS: Status: ACTIVE | Noted: 2022-05-02

## 2022-05-03 ENCOUNTER — OFFICE VISIT (OUTPATIENT)
Dept: INTERNAL MEDICINE | Facility: CLINIC | Age: 47
End: 2022-05-03
Payer: COMMERCIAL

## 2022-05-03 VITALS
WEIGHT: 237.9 LBS | HEIGHT: 77 IN | BODY MASS INDEX: 28.09 KG/M2 | TEMPERATURE: 98 F | HEART RATE: 74 BPM | DIASTOLIC BLOOD PRESSURE: 65 MMHG | SYSTOLIC BLOOD PRESSURE: 123 MMHG | RESPIRATION RATE: 16 BRPM

## 2022-05-03 DIAGNOSIS — Z12.11 SCREEN FOR COLON CANCER: ICD-10-CM

## 2022-05-03 DIAGNOSIS — G47.33 MILD OBSTRUCTIVE SLEEP APNEA: ICD-10-CM

## 2022-05-03 DIAGNOSIS — Z11.59 NEED FOR HEPATITIS C SCREENING TEST: ICD-10-CM

## 2022-05-03 DIAGNOSIS — Z00.00 ROUTINE PHYSICAL EXAMINATION: Primary | ICD-10-CM

## 2022-05-03 DIAGNOSIS — Z13.220 LIPID SCREENING: ICD-10-CM

## 2022-05-03 DIAGNOSIS — K21.9 GASTROESOPHAGEAL REFLUX DISEASE, UNSPECIFIED WHETHER ESOPHAGITIS PRESENT: ICD-10-CM

## 2022-05-03 DIAGNOSIS — Z13.228 SCREENING FOR METABOLIC DISORDER: ICD-10-CM

## 2022-05-03 DIAGNOSIS — Z12.5 SCREENING FOR PROSTATE CANCER: ICD-10-CM

## 2022-05-03 LAB
ANION GAP SERPL CALCULATED.3IONS-SCNC: 12 MMOL/L (ref 5–18)
BUN SERPL-MCNC: 25 MG/DL (ref 8–22)
CALCIUM SERPL-MCNC: 9.7 MG/DL (ref 8.5–10.5)
CHLORIDE BLD-SCNC: 104 MMOL/L (ref 98–107)
CHOLEST SERPL-MCNC: 251 MG/DL
CO2 SERPL-SCNC: 25 MMOL/L (ref 22–31)
CREAT SERPL-MCNC: 0.99 MG/DL (ref 0.7–1.3)
ERYTHROCYTE [DISTWIDTH] IN BLOOD BY AUTOMATED COUNT: 13.1 % (ref 10–15)
FASTING STATUS PATIENT QL REPORTED: YES
GFR SERPL CREATININE-BSD FRML MDRD: >90 ML/MIN/1.73M2
GLUCOSE BLD-MCNC: 103 MG/DL (ref 70–125)
HCT VFR BLD AUTO: 44.2 % (ref 40–53)
HDLC SERPL-MCNC: 56 MG/DL
HGB BLD-MCNC: 14.9 G/DL (ref 13.3–17.7)
LDLC SERPL CALC-MCNC: 179 MG/DL
MCH RBC QN AUTO: 27.6 PG (ref 26.5–33)
MCHC RBC AUTO-ENTMCNC: 33.7 G/DL (ref 31.5–36.5)
MCV RBC AUTO: 82 FL (ref 78–100)
PLATELET # BLD AUTO: 221 10E3/UL (ref 150–450)
POTASSIUM BLD-SCNC: 4.7 MMOL/L (ref 3.5–5)
PSA SERPL-MCNC: 0.7 UG/L (ref 0–2.5)
RBC # BLD AUTO: 5.39 10E6/UL (ref 4.4–5.9)
SODIUM SERPL-SCNC: 141 MMOL/L (ref 136–145)
TRIGL SERPL-MCNC: 81 MG/DL
WBC # BLD AUTO: 5.2 10E3/UL (ref 4–11)

## 2022-05-03 PROCEDURE — 85027 COMPLETE CBC AUTOMATED: CPT | Performed by: INTERNAL MEDICINE

## 2022-05-03 PROCEDURE — 80048 BASIC METABOLIC PNL TOTAL CA: CPT | Performed by: INTERNAL MEDICINE

## 2022-05-03 PROCEDURE — 99213 OFFICE O/P EST LOW 20 MIN: CPT | Mod: 25 | Performed by: INTERNAL MEDICINE

## 2022-05-03 PROCEDURE — 99396 PREV VISIT EST AGE 40-64: CPT | Mod: 25 | Performed by: INTERNAL MEDICINE

## 2022-05-03 PROCEDURE — 90471 IMMUNIZATION ADMIN: CPT | Performed by: INTERNAL MEDICINE

## 2022-05-03 PROCEDURE — 36415 COLL VENOUS BLD VENIPUNCTURE: CPT | Performed by: INTERNAL MEDICINE

## 2022-05-03 PROCEDURE — 80061 LIPID PANEL: CPT | Performed by: INTERNAL MEDICINE

## 2022-05-03 PROCEDURE — 90714 TD VACC NO PRESV 7 YRS+ IM: CPT | Performed by: INTERNAL MEDICINE

## 2022-05-03 PROCEDURE — G0103 PSA SCREENING: HCPCS | Performed by: INTERNAL MEDICINE

## 2022-05-03 RX ORDER — PANTOPRAZOLE SODIUM 40 MG/1
40 TABLET, DELAYED RELEASE ORAL 2 TIMES DAILY
Qty: 180 TABLET | Refills: 3 | Status: SHIPPED | OUTPATIENT
Start: 2022-05-03 | End: 2023-05-08

## 2022-05-03 ASSESSMENT — PAIN SCALES - GENERAL: PAINLEVEL: NO PAIN (0)

## 2022-05-03 NOTE — LETTER
5/3/2022    Angelo Lino   [address] [facility] [address]         Dear Angelo,    It was good to see you in clinic.  I hope your questions were answered at the time of your visit.    The results of your tests from your visit were as follows:    Resulted Orders   PSA, screen   Result Value Ref Range    Prostate Specific Antigen Screen 0.70 0.00 - 2.50 ug/L   CBC with platelets   Result Value Ref Range    WBC Count 5.2 4.0 - 11.0 10e3/uL    RBC Count 5.39 4.40 - 5.90 10e6/uL    Hemoglobin 14.9 13.3 - 17.7 g/dL    Hematocrit 44.2 40.0 - 53.0 %    MCV 82 78 - 100 fL    MCH 27.6 26.5 - 33.0 pg    MCHC 33.7 31.5 - 36.5 g/dL    RDW 13.1 10.0 - 15.0 %    Platelet Count 221 150 - 450 10e3/uL   Basic metabolic panel   Result Value Ref Range    Sodium 141 136 - 145 mmol/L    Potassium 4.7 3.5 - 5.0 mmol/L    Chloride 104 98 - 107 mmol/L    Carbon Dioxide (CO2) 25 22 - 31 mmol/L    Anion Gap 12 5 - 18 mmol/L    Urea Nitrogen 25 (H) 8 - 22 mg/dL    Creatinine 0.99 0.70 - 1.30 mg/dL    Calcium 9.7 8.5 - 10.5 mg/dL    Glucose 103 70 - 125 mg/dL    GFR Estimate >90 >60 mL/min/1.73m2   Lipid panel reflex to direct LDL Fasting   Result Value Ref Range    Cholesterol 251 (H) <=199 mg/dL    Triglycerides 81 <=149 mg/dL    Direct Measure HDL 56 >=40 mg/dL    LDL Cholesterol Calculated 179 (H) <=129 mg/dL    Patient Fasting > 8hrs? Yes      There are no signs of prostate cancer.     Your kidney tests are normal.  Your electrolytes are also normal.  There is no signs of diabetes.     Your blood counts are normal and you are not anemic.     Your cholesterol is still elevated but better than last time.  You are currently at a low risk for heart issues, so cholesterol medication is not recommended at this time.  Keep your weight down as much as possible.    Please follow up again in 12-18 months for a repeat physical.    If you have any questions regarding these results, please feel free to contact me at [phone].  I wish you the  best of health!      Sincerely,       Stanislaw Luo MD  General Internal Medicine  [facility]

## 2022-05-03 NOTE — PROGRESS NOTES
[facility]    Comprehensive and complex medical care - Chronic disease management - Shared decision making - Care coordination - Compassionate care    Patient advocacy - Rational deprescribing - Minimally disruptive medicine - Ethical focus - Customized care         Date of Service: 5/3/2022  Primary Provider: Stanislaw Luo    Patient Care Team:  Stanislaw Luo MD as PCP - General (Internal Medicine)  Stanislaw Luo MD as Assigned PCP  Angie Llanes MD as MD (Internal Medicine)  Atilio Lopez MD as MD (Gastroenterology)          Patient's Pharmacy:    [id number] - [place], [place] - [address] AT [facility] [address]  [address]  [facility] [address]  Phone: [phone] Fax: [phone]     Patient's Contacts:  Name Home Phone Work Phone Mobile Phone Relationship Lgl DAYRON Davison [phone]   Spouse      Patient's Insurance:    Payor: BCBS / Plan: [place] / Product Type: Indemnity /           ROUTINE PHYSICAL    Active Problem List:  Problem List as of 5/3/2022 Reviewed: 5/3/2022 11:41 AM by Stanislaw Luo MD       High    Nonsmoker       Medium    GERD (gastroesophageal reflux disease)    Eosinophilic esophagitis    Mild obstructive sleep apnea       Low    HLD (hyperlipidemia)    AR (allergic rhinitis)    Blindness of left eye           Past Medical History:   Diagnosis Date     AR (allergic rhinitis)      Blindness of left eye      BRCA negative      GERD (gastroesophageal reflux disease)      HLD (hyperlipidemia)      Mild obstructive sleep apnea 05/02/2022    Sleep study 2021.      Nonsmoker      History reviewed. No pertinent surgical history.  Social History     Occupational History     Not on file   Tobacco Use     Smoking status: Never Smoker     Smokeless tobacco: Never Used   Vaping Use     Vaping Use: Never used   Substance and Sexual Activity     Alcohol use: Yes     Alcohol/week: 4.0  standard drinks     Types: 4 Standard drinks or equivalent per week     Drug use: Never     Sexual activity: Yes     Partners: Female      Social History     Social History Narrative    , 1 daughter.      Family History   Problem Relation Age of Onset     Breast Cancer Mother      Genetic Disorder Mother         BRCA2     Multiple myeloma Father      Breast Cancer Sister 45     Family history is otherwise noncontributory.    Current Outpatient Medications   Medication Instructions     pantoprazole (PROTONIX) 40 mg, Oral, 2 TIMES DAILY      Allergies: Patient has no known allergies.     Immunization History   Administered Date(s) Administered     COVID-19,PF,Pfizer (12+ Yrs) 02/27/2021, 03/18/2021, 11/30/2021     DT (PEDS <7y) 01/01/2002     Flu, Unspecified 12/13/2007, 11/01/2019     Influenza (IIV3) PF 12/13/2007, 09/18/2014     Influenza Vaccine IM > 6 months Valent IIV4 (Alfuria,Fluzone) 12/26/2017, 12/13/2020, 11/06/2021     Influenza,INJ,MDCK,PF,Quad >4yrs 11/20/2019     TD (ADULT, 7+) 05/03/2022     Td,adult,historic,unspecified 01/01/2002     Tdap (Adacel,Boostrix) 06/10/2010, 09/19/2011             Angelo Lino is a 46 year old male coming in today for:    Chief Complaint   Patient presents with     Physical     Annual       He does  have other issues outside the physical to discuss as well.    Patient comes in today for routine physical and for other issues.    He is generally doing well overall.  No major concerns.    We mainly reviewed his sleep apnea.  He is doing well with his CPAP.  He is not waking up in the melanite with migraines at this point.  He was doing this 3-4 times a week up until being treated.  He was felt to have mild sleep apnea on his testing.  This note was reviewed.    We reviewed his reflux.  He is taking 40 mg of pantoprazole at night and 20 mg in the morning.  His endoscopy results from Dr. Lopez were noted.  He still gets occasional sticking in his throat.  He did have a  "mild dilation done with his last procedure in April.    We are doing screening test to follow-up on his cholesterol which was elevated in the past.  We will check his PSA to have a new baseline.    His mom is BRCA2 positive.  He did have testing for this which was negative.  His mom and his sister have breast cancer.  His sister is also negative for the genetic mutation.    He and I discussed colon cancer screening and he would like to proceed with this at this point.  He has the option to check with his insurance to make sure that it is covered.      He is not at risk for hepatitis C at this point.    We reviewed his other issues noted in the assessment but not specifically addressed in the HPI above.     Exam:     Wt Readings from Last 3 Encounters:   05/03/22 107.9 kg (237 lb 14.4 oz)   01/20/20 103.5 kg (228 lb 3.2 oz)   06/07/18 102.8 kg (226 lb 9.6 oz)     BP Readings from Last 3 Encounters:   05/03/22 123/65   01/20/20 112/70   02/26/12 117/78     /65 (BP Location: Right arm, Patient Position: Sitting, Cuff Size: Adult Regular)   Pulse 74   Temp 98  F (36.7  C) (Oral)   Resp 16   Ht 1.956 m (6' 5\")   Wt 107.9 kg (237 lb 14.4 oz)   BMI 28.21 kg/m     The patient is in no acute distress.  Mood good.  Insight good.  No skin lesions or nodules of concern.  Ears are clear.  Nose is clear.  Throat is clear.  Neck is supple  and there is no thyromegaly. No cervical, epitrochlear or axillary adenopathy.  Heart regular rate and rhythm.  No murmur present.  Lungs clear to auscultation bilaterally.  Respiratory effort is good.  Abdomen is soft, nontender.  No hepatosplenomegaly.  Extremities show no edema.         Diagnostics:     Office Visit - [facility] on 01/20/2020   Component Date Value Ref Range Status     WBC 01/20/2020 5.8  4.0 - 11.0 thou/uL Final     RBC Count 01/20/2020 5.78  4.40 - 6.20 mill/uL Final     Hemoglobin 01/20/2020 16.2  14.0 - 18.0 g/dL Final     Hematocrit 01/20/2020 49.1  40.0 - " 54.0 % Final     MCV 01/20/2020 85  80 - 100 fL Final     MCH 01/20/2020 28.1  27.0 - 34.0 pg Final     MCHC 01/20/2020 33.0  32.0 - 36.0 g/dL Final     RDW 01/20/2020 11.7  11.0 - 14.5 % Final     Platelet Count 01/20/2020 241  140 - 440 thou/uL Final     Mean Platelet Volume 01/20/2020 7.9  7.0 - 10.0 fL Final     TSH 01/20/2020 1.73  0.30 - 5.00 uIU/mL Final     Cholesterol 01/20/2020 277 (A) <=199 mg/dL Final     Triglycerides 01/20/2020 77  <=149 mg/dL Final     Direct Measure HDL 01/20/2020 57  >=40 mg/dL Final     LDL Cholesterol Calculated 01/20/2020 205 (A) <=129 mg/dL Final     Patient Fasting > 8hrs? 01/20/2020 Yes   Final        Results for orders placed or performed in visit on 05/03/22   CBC with platelets     Status: Normal   Result Value Ref Range    WBC Count 5.2 4.0 - 11.0 10e3/uL    RBC Count 5.39 4.40 - 5.90 10e6/uL    Hemoglobin 14.9 13.3 - 17.7 g/dL    Hematocrit 44.2 40.0 - 53.0 %    MCV 82 78 - 100 fL    MCH 27.6 26.5 - 33.0 pg    MCHC 33.7 31.5 - 36.5 g/dL    RDW 13.1 10.0 - 15.0 %    Platelet Count 221 150 - 450 10e3/uL        Assessment:     1. Routine physical examination    2. Mild obstructive sleep apnea    3. Gastroesophageal reflux disease, unspecified whether esophagitis present    4. Screen for colon cancer    5. Need for hepatitis C screening test    6. Screening for prostate cancer    7. Lipid screening    8. Screening for metabolic disorder        Plan:     1. Blood work done today.  2. Tetanus update done today.  3. Continue follow-up with sleep apnea provider.  He is tolerating and doing well with current CPAP therapy.  It does benefit him from a symptomatic standpoint.  He is using it regularly.  4. We can provide a prescription for him for the supplier if issues come up.  5. Continue pantoprazole.  May reduce to 20 twice daily if he wishes but no necessary need to do so.  6. Colonoscopy ordered.  7. Continue current medications otherwise.  8. Follow up sooner if issues.          Stanislaw Luo MD  General Internal Medicine  [facility]    Return in about 18 months (around 11/3/2023), or if symptoms worsen or fail to improve, for physical.     No future appointments.

## 2022-05-03 NOTE — PROGRESS NOTES
Patient has been advised of split billing requirements and indicates understanding: Yes  Healthy Habits:     Getting at least 3 servings of Calcium per day:  Yes    Bi-annual eye exam:  Yes    Dental care twice a year:  Yes    Sleep apnea or symptoms of sleep apnea:  Sleep apnea    Diet:  Regular (no restrictions)    Frequency of exercise:  2-3 days/week    Duration of exercise:  15-30 minutes    Taking medications regularly:  Yes    Medication side effects:  None    PHQ-2 Total Score: 0    Additional concerns today:  No    Today's PHQ-2 Score:   PHQ-2 ( 1999 Pfizer) 5/3/2022   Q1: Little interest or pleasure in doing things 0   Q2: Feeling down, depressed or hopeless 0   PHQ-2 Score 0   Q1: Little interest or pleasure in doing things Not at all   Q2: Feeling down, depressed or hopeless Not at all   PHQ-2 Score 0       Abuse: Current or Past(Physical, Sexual or Emotional)- No  Do you feel safe in your environment? Yes

## 2022-05-30 ENCOUNTER — OFFICE VISIT (OUTPATIENT)
Dept: FAMILY MEDICINE | Facility: CLINIC | Age: 47
End: 2022-05-30
Payer: COMMERCIAL

## 2022-05-30 VITALS
RESPIRATION RATE: 18 BRPM | BODY MASS INDEX: 28.21 KG/M2 | SYSTOLIC BLOOD PRESSURE: 133 MMHG | DIASTOLIC BLOOD PRESSURE: 87 MMHG | TEMPERATURE: 97.1 F | HEART RATE: 68 BPM | WEIGHT: 237.88 LBS | OXYGEN SATURATION: 98 %

## 2022-05-30 DIAGNOSIS — S69.91XA FISH HOOK INJURY OF FINGER OF RIGHT HAND, INITIAL ENCOUNTER: Primary | ICD-10-CM

## 2022-05-30 PROCEDURE — 99213 OFFICE O/P EST LOW 20 MIN: CPT | Performed by: FAMILY MEDICINE

## 2022-05-30 RX ORDER — SULFAMETHOXAZOLE/TRIMETHOPRIM 800-160 MG
1 TABLET ORAL 2 TIMES DAILY
Qty: 24 TABLET | Refills: 0 | Status: SHIPPED | OUTPATIENT
Start: 2022-05-30 | End: 2022-06-11

## 2022-05-30 NOTE — PROGRESS NOTES
Problem List Items Addressed This Visit    None     Visit Diagnoses     Fish hook injury of finger of right hand, initial encounter    -  Primary    Relevant Medications    sulfamethoxazole-trimethoprim (BACTRIM DS) 800-160 MG tablet             Shantel Stephens is a 46 year old who presents for the following health issues   Chief Complaint   Patient presents with     Finger     Rt hand ring finger - fish hook stuck on finger       Small fishhook to right hand ring finger near the lateral edge of fingernail.  Accidentally went in about an hour ago.  He says the hemanth is really small and he was trying to get it out on his own and was unsuccessful.  He did trim off the lower as well as the other 2 barbs of the trouble hook.  Looks to be incised to the trouble hook.  Tetanus is up-to-date.       Review of Systems   All other systems reviewed and are negative.           Objective    /87   Pulse 68   Temp 97.1  F (36.2  C) (Tympanic)   Resp 18   Wt 107.9 kg (237 lb 14 oz)   SpO2 98%   BMI 28.21 kg/m    Body mass index is 28.21 kg/m .  Physical Exam  Vitals and nursing note reviewed.   Constitutional:       General: He is not in acute distress.     Appearance: Normal appearance. He is not ill-appearing.   HENT:      Head: Normocephalic and atraumatic.   Eyes:      Extraocular Movements: Extraocular movements intact.      Conjunctiva/sclera: Conjunctivae normal.   Pulmonary:      Effort: Pulmonary effort is normal.   Musculoskeletal:      Right lower leg: No edema.      Left lower leg: No edema.   Skin:     Capillary Refill: Capillary refill takes less than 2 seconds.      Comments: Thumb side and ring finger sitting just lateral to the small fishhook with hemanth inside the skin fold.  No nail involvement.   Neurological:      Mental Status: He is alert and oriented to person, place, and time.      Sensory: Sensation is intact.      Motor: No weakness.   Psychiatric:         Attention and Perception:  Attention normal.         Mood and Affect: Mood normal.         Speech: Speech normal.         Thought Content: Thought content normal.     Procedure: [place] removal  Postprocedural: Same  See description above.  0.5 mL of 1% lidocaine without epinephrine applied locally  Rest of trouble hook was.  Unfortunately due to the size of the hook actually pushing through was not an option.  Thus using posterior traction the hook was backed out of the insertion site.  Bleeding was controlled.  Patient tolerated well.  Hemostatic.  No complications.  Due to the fact that this is a previously used hook, he was placed on Bactrim for possible infection.  Also advised to ice throughout the day and tomorrow as well as using NSAIDs.  Warning signs and symptoms for infection reviewed.            This note has been dictated using voice recognition software. Any grammatical or context distortions are unintentional and inherent to the software

## 2022-11-18 ENCOUNTER — IMMUNIZATION (OUTPATIENT)
Dept: FAMILY MEDICINE | Facility: CLINIC | Age: 47
End: 2022-11-18
Payer: COMMERCIAL

## 2022-11-18 PROCEDURE — 91312 COVID-19,PF,PFIZER BOOSTER BIVALENT: CPT

## 2022-11-18 PROCEDURE — 90686 IIV4 VACC NO PRSV 0.5 ML IM: CPT

## 2022-11-18 PROCEDURE — 0124A COVID-19,PF,PFIZER BOOSTER BIVALENT: CPT

## 2022-11-18 PROCEDURE — 90471 IMMUNIZATION ADMIN: CPT

## 2023-02-22 ENCOUNTER — TELEPHONE (OUTPATIENT)
Dept: INTERNAL MEDICINE | Facility: CLINIC | Age: 48
End: 2023-02-22
Payer: COMMERCIAL

## 2023-02-22 NOTE — TELEPHONE ENCOUNTER
FYI - Status Update    Who is Calling: patient    Update: [facility] advised patient to call clinic, needs Prior Authorization renewed for Pantoprazole (PROTONIX) 40 mg

## 2023-02-27 NOTE — TELEPHONE ENCOUNTER
Central Prior Authorization Team   Phone: [phone]      PA Initiation    Medication: pantoprazole  Insurance Company: [employer] - Phone [phone] Fax [phone]  Pharmacy Filling the Rx: [id number] - [place], [place] - [facility] [address] AT [facility] [address]  Filling Pharmacy Phone: [phone]  Filling Pharmacy Fax:    Start Date: 2/27/2023

## 2023-02-28 NOTE — TELEPHONE ENCOUNTER
Prior Authorization Approval    Authorization Effective Date: 2/27/2023  Authorization Expiration Date: 2/27/2024  Medication: Pantoprazole-APPROVED  Approved Dose/Quantity:   Reference #:     Insurance Company: [employer] - Phone [phone] Fax [phone]  Expected CoPay:       CoPay Card Available:      Foundation Assistance Needed:    Which Pharmacy is filling the prescription (Not needed for infusion/clinic administered): [facility] #[id number] - [place] [address] [address] AT [facility] [address]  Pharmacy Notified: Yes  Patient Notified: No  **Instructed pharmacy to notify patient when script is ready to /ship.**

## 2023-05-08 ENCOUNTER — OFFICE VISIT (OUTPATIENT)
Dept: INTERNAL MEDICINE | Facility: CLINIC | Age: 48
End: 2023-05-08
Payer: COMMERCIAL

## 2023-05-08 VITALS
BODY MASS INDEX: 29.31 KG/M2 | WEIGHT: 253.3 LBS | HEIGHT: 78 IN | DIASTOLIC BLOOD PRESSURE: 80 MMHG | OXYGEN SATURATION: 95 % | SYSTOLIC BLOOD PRESSURE: 126 MMHG | HEART RATE: 65 BPM

## 2023-05-08 DIAGNOSIS — M25.561 CHRONIC PAIN OF RIGHT KNEE: ICD-10-CM

## 2023-05-08 DIAGNOSIS — G89.29 CHRONIC PAIN OF RIGHT KNEE: ICD-10-CM

## 2023-05-08 DIAGNOSIS — Z13.220 LIPID SCREENING: ICD-10-CM

## 2023-05-08 DIAGNOSIS — Z12.5 SCREENING FOR PROSTATE CANCER: ICD-10-CM

## 2023-05-08 DIAGNOSIS — M25.522 LEFT ELBOW PAIN: ICD-10-CM

## 2023-05-08 DIAGNOSIS — Z12.11 SCREEN FOR COLON CANCER: ICD-10-CM

## 2023-05-08 DIAGNOSIS — G47.33 MILD OBSTRUCTIVE SLEEP APNEA: ICD-10-CM

## 2023-05-08 DIAGNOSIS — Z00.00 ROUTINE PHYSICAL EXAMINATION: Primary | ICD-10-CM

## 2023-05-08 DIAGNOSIS — K21.9 GASTROESOPHAGEAL REFLUX DISEASE, UNSPECIFIED WHETHER ESOPHAGITIS PRESENT: ICD-10-CM

## 2023-05-08 LAB
ANION GAP SERPL CALCULATED.3IONS-SCNC: 10 MMOL/L (ref 7–15)
BUN SERPL-MCNC: 21.4 MG/DL (ref 6–20)
CALCIUM SERPL-MCNC: 9.6 MG/DL (ref 8.6–10)
CHLORIDE SERPL-SCNC: 102 MMOL/L (ref 98–107)
CHOLEST SERPL-MCNC: 258 MG/DL
CREAT SERPL-MCNC: 1.09 MG/DL (ref 0.67–1.17)
DEPRECATED HCO3 PLAS-SCNC: 27 MMOL/L (ref 22–29)
ERYTHROCYTE [DISTWIDTH] IN BLOOD BY AUTOMATED COUNT: 12.9 % (ref 10–15)
GFR SERPL CREATININE-BSD FRML MDRD: 85 ML/MIN/1.73M2
GLUCOSE SERPL-MCNC: 103 MG/DL (ref 70–99)
HCT VFR BLD AUTO: 44 % (ref 40–53)
HDLC SERPL-MCNC: 49 MG/DL
HGB BLD-MCNC: 15 G/DL (ref 13.3–17.7)
LDLC SERPL CALC-MCNC: 187 MG/DL
MAGNESIUM SERPL-MCNC: 2.6 MG/DL (ref 1.7–2.3)
MCH RBC QN AUTO: 28.2 PG (ref 26.5–33)
MCHC RBC AUTO-ENTMCNC: 34.1 G/DL (ref 31.5–36.5)
MCV RBC AUTO: 83 FL (ref 78–100)
NONHDLC SERPL-MCNC: 209 MG/DL
PLATELET # BLD AUTO: 207 10E3/UL (ref 150–450)
POTASSIUM SERPL-SCNC: 4 MMOL/L (ref 3.4–5.3)
PSA SERPL DL<=0.01 NG/ML-MCNC: 0.73 NG/ML (ref 0–2.5)
RBC # BLD AUTO: 5.32 10E6/UL (ref 4.4–5.9)
SODIUM SERPL-SCNC: 139 MMOL/L (ref 136–145)
TRIGL SERPL-MCNC: 109 MG/DL
VIT B12 SERPL-MCNC: 755 PG/ML (ref 232–1245)
WBC # BLD AUTO: 5.3 10E3/UL (ref 4–11)

## 2023-05-08 PROCEDURE — 83735 ASSAY OF MAGNESIUM: CPT | Performed by: INTERNAL MEDICINE

## 2023-05-08 PROCEDURE — 82607 VITAMIN B-12: CPT | Performed by: INTERNAL MEDICINE

## 2023-05-08 PROCEDURE — 36415 COLL VENOUS BLD VENIPUNCTURE: CPT | Performed by: INTERNAL MEDICINE

## 2023-05-08 PROCEDURE — G0103 PSA SCREENING: HCPCS | Performed by: INTERNAL MEDICINE

## 2023-05-08 PROCEDURE — 99396 PREV VISIT EST AGE 40-64: CPT | Performed by: INTERNAL MEDICINE

## 2023-05-08 PROCEDURE — 85027 COMPLETE CBC AUTOMATED: CPT | Performed by: INTERNAL MEDICINE

## 2023-05-08 PROCEDURE — 99213 OFFICE O/P EST LOW 20 MIN: CPT | Mod: 25 | Performed by: INTERNAL MEDICINE

## 2023-05-08 PROCEDURE — 80048 BASIC METABOLIC PNL TOTAL CA: CPT | Performed by: INTERNAL MEDICINE

## 2023-05-08 PROCEDURE — 80061 LIPID PANEL: CPT | Performed by: INTERNAL MEDICINE

## 2023-05-08 RX ORDER — PANTOPRAZOLE SODIUM 40 MG/1
40 TABLET, DELAYED RELEASE ORAL 2 TIMES DAILY
Qty: 180 TABLET | Refills: 3 | Status: SHIPPED | OUTPATIENT
Start: 2023-05-08 | End: 2024-05-21

## 2023-05-08 ASSESSMENT — PAIN SCALES - GENERAL: PAINLEVEL: NO PAIN (0)

## 2023-05-08 ASSESSMENT — ENCOUNTER SYMPTOMS
PARESTHESIAS: 0
NAUSEA: 0
COUGH: 0
ARTHRALGIAS: 1
EYE PAIN: 0
CONSTIPATION: 0
MYALGIAS: 0
WEAKNESS: 0
HEMATURIA: 0
CHILLS: 0
DIZZINESS: 0
SHORTNESS OF BREATH: 0
DYSURIA: 0
ABDOMINAL PAIN: 0
FREQUENCY: 0
JOINT SWELLING: 0
SORE THROAT: 0
HEARTBURN: 0
PALPITATIONS: 0
HEADACHES: 0
HEMATOCHEZIA: 0
FEVER: 0
NERVOUS/ANXIOUS: 0
DIARRHEA: 0

## 2023-05-08 NOTE — PROGRESS NOTES
[facility]    Comprehensive and complex medical care - Chronic disease management - Shared decision making - Care coordination - Compassionate care    Patient advocacy - Rational deprescribing - Minimally disruptive medicine - Ethical focus - Customized care         Date of Service: 5/8/2023  Primary Provider: Stanislaw Luo    Patient Care Team:  Stanislaw Luo MD as PCP - General (Internal Medicine)  Stanislaw Luo MD as Assigned PCP  Angie Llanes MD as MD (Internal Medicine)  Atilio Lopez MD as MD (Gastroenterology)          Patient's Pharmacy:    [id number] - [place], [place] - [address] AT [facility] [address]  [address]  [facility] [address]  Phone: [phone] Fax: [phone]     Patient's Contacts:  Name Home Phone Work Phone Mobile Phone Relationship Lgl DAYRON Davison [phone]   Spouse      Patient's Insurance:    Payor: BCBS / Plan: [place] / Product Type: Indemnity /           ROUTINE PHYSICAL    Active Problem List:  Problem List as of 5/8/2023 Reviewed: 5/8/2023 11:29 AM by Stanislaw Luo MD       High    Nonsmoker       Medium    GERD (gastroesophageal reflux disease)    Eosinophilic esophagitis    Mild obstructive sleep apnea       Low    AR (allergic rhinitis)    HLD (hyperlipidemia)    Blindness of left eye        Past Medical History:   Diagnosis Date     AR (allergic rhinitis)      Blindness of left eye      BRCA negative      GERD (gastroesophageal reflux disease)      HLD (hyperlipidemia)      Mild obstructive sleep apnea 05/02/2022    Sleep study 2021.      Nonsmoker      No past surgical history on file.  Social History     Occupational History     Not on file   Tobacco Use     Smoking status: Never     Smokeless tobacco: Never   Vaping Use     Vaping status: Never Used   Substance and Sexual Activity     Alcohol use: Yes     Alcohol/week: 4.0 standard drinks of alcohol      Types: 4 Standard drinks or equivalent per week     Drug use: Never     Sexual activity: Yes     Partners: Female      Social History     Social History Narrative    , 1 daughter.      Family History   Problem Relation Age of Onset     Breast Cancer Mother      Genetic Disorder Mother         BRCA2     Multiple myeloma Father      Breast Cancer Sister 45     Family history is otherwise noncontributory.    Current Outpatient Medications   Medication Instructions     pantoprazole (PROTONIX) 40 mg, Oral, 2 TIMES DAILY      Allergies: Patient has no known allergies.     Immunization History   Administered Date(s) Administered     COVID-19 Bivalent 12+ (Pfizer) 11/18/2022     COVID-19 MONOVALENT 12+ (Pfizer) 02/27/2021, 03/18/2021, 11/30/2021     DT (PEDS <7y) 01/01/2002     Flu, Unspecified 12/13/2007, 11/01/2019     Influenza (IIV3) PF 12/13/2007, 09/18/2014     Influenza Vaccine >6 months (Alfuria,Fluzone) 12/26/2017, 12/13/2020, 11/06/2021, 11/18/2022     Influenza,INJ,MDCK,PF,Quad >6mo(Flucelvax) 11/20/2019     TD,PF 7+ (Tenivac) 05/03/2022     TDAP (Adacel,Boostrix) 06/10/2010, 09/19/2011     Td,adult,historic,unspecified 01/01/2002             Angelo Lino is a 47 year old male coming in today for:    Chief Complaint   Patient presents with     Physical          5/8/2023     6:58 AM   Additional Questions   Roomed by Hari DUMONT CMA     He does  have other issues outside the physical to discuss as well.    Patient comes in today for routine physical and for other issues.    We reviewed his reflux disease.  This is controlled with 20 mg of Protonix in the morning and 40 in the evening.  He has tried tapering in the past with recurrence of symptoms.  He has had EGD in the past and this was reviewed with him.    We reviewed his colon cancer screening.  He was unable to perform this last year but is ready to go ahead with that this year.  He does have cancer in his family.  His mother had BRCA2 but he  "turned out negative for this on testing.    We reviewed his left elbow.  He has been having intermittent pain here.  It is mostly located over the olecranon.  He has not noticed swelling with this.  He has had tendinitis in the right elbow in the past requiring injection.  It is not bad at this time.    He has had right knee pain intermittently over the last year.  It is over the anterior knee.  It is worse with uneven ground.  It does not lock up on him and he denies any giving way.  No swelling.  Mostly when he is on his feet.    No other joint pains of concern at this point.    He is okay with yearly follow-ups at this time given his family history of cancer.    We reviewed his other issues noted in the assessment but not specifically addressed in the HPI above.     Exam:     Wt Readings from Last 3 Encounters:   05/08/23 114.9 kg (253 lb 4.8 oz)   05/30/22 107.9 kg (237 lb 14 oz)   05/03/22 107.9 kg (237 lb 14.4 oz)     BP Readings from Last 3 Encounters:   05/08/23 126/80   05/30/22 133/87   05/03/22 123/65     /80 (BP Location: Right arm, Patient Position: Sitting, Cuff Size: Adult Regular)   Pulse 65   Ht 2.019 m (6' 7.5\")   Wt 114.9 kg (253 lb 4.8 oz)   SpO2 95%   BMI 28.18 kg/m     The patient is in no acute distress.  Mood good.  Insight good.  No skin lesions or nodules of concern.  Ears are clear.  Nose is clear.  Throat is clear.  Neck is supple  and there is no thyromegaly. No cervical, epitrochlear or axillary adenopathy.  Heart regular rate and rhythm.  No murmur present.  Lungs clear to auscultation bilaterally.  Respiratory effort is good.  Abdomen is soft, nontender.  No hepatosplenomegaly.  Extremities show no edema.  No abnormalities with the left elbow detected.  The right knee shows no effusion.  There is no ligamentous instability.       Diagnostics:     Office Visit on 05/03/2022   Component Date Value Ref Range Status     Prostate Specific Antigen Screen 05/03/2022 0.70  0.00 - " 2.50 ug/L Final     WBC Count 05/03/2022 5.2  4.0 - 11.0 10e3/uL Final     RBC Count 05/03/2022 5.39  4.40 - 5.90 10e6/uL Final     Hemoglobin 05/03/2022 14.9  13.3 - 17.7 g/dL Final     Hematocrit 05/03/2022 44.2  40.0 - 53.0 % Final     MCV 05/03/2022 82  78 - 100 fL Final     MCH 05/03/2022 27.6  26.5 - 33.0 pg Final     MCHC 05/03/2022 33.7  31.5 - 36.5 g/dL Final     RDW 05/03/2022 13.1  10.0 - 15.0 % Final     Platelet Count 05/03/2022 221  150 - 450 10e3/uL Final     Sodium 05/03/2022 141  136 - 145 mmol/L Final     Potassium 05/03/2022 4.7  3.5 - 5.0 mmol/L Final     Chloride 05/03/2022 104  98 - 107 mmol/L Final     Carbon Dioxide (CO2) 05/03/2022 25  22 - 31 mmol/L Final     Anion Gap 05/03/2022 12  5 - 18 mmol/L Final     Urea Nitrogen 05/03/2022 25 (H)  8 - 22 mg/dL Final     Creatinine 05/03/2022 0.99  0.70 - 1.30 mg/dL Final     Calcium 05/03/2022 9.7  8.5 - 10.5 mg/dL Final     Glucose 05/03/2022 103  70 - 125 mg/dL Final     GFR Estimate 05/03/2022 >90  >60 mL/min/1.73m2 Final    Effective December 21, 2021 eGFRcr in adults is calculated using the 2021 CKD-EPI creatinine equation which includes age and gender (Viraj et al., NEJ, DOI: 10.1056/ZCXHuc9735023)     Cholesterol 05/03/2022 251 (H)  <=199 mg/dL Final     Triglycerides 05/03/2022 81  <=149 mg/dL Final     Direct Measure HDL 05/03/2022 56  >=40 mg/dL Final    HDL Cholesterol Reference Range:     0-2 years:   No reference ranges established for patients under 2 years old  at [facility] for lipid analytes.    2-8 years:  Greater than 45 mg/dL     18 years and older:   Female: Greater than or equal to 50 mg/dL   Male:   Greater than or equal to 40 mg/dL     LDL Cholesterol Calculated 05/03/2022 179 (H)  <=129 mg/dL Final     Patient Fasting > 8hrs? 05/03/2022 Yes   Final        Results for orders placed or performed in visit on 05/08/23   CBC with platelets     Status: Normal   Result Value Ref Range    WBC Count 5.3 4.0 - 11.0  10e3/uL    RBC Count 5.32 4.40 - 5.90 10e6/uL    Hemoglobin 15.0 13.3 - 17.7 g/dL    Hematocrit 44.0 40.0 - 53.0 %    MCV 83 78 - 100 fL    MCH 28.2 26.5 - 33.0 pg    MCHC 34.1 31.5 - 36.5 g/dL    RDW 12.9 10.0 - 15.0 %    Platelet Count 207 150 - 450 10e3/uL        Assessment:     1. Routine physical examination    2. Gastroesophageal reflux disease, unspecified whether esophagitis present    3. Screen for colon cancer    4. Screening for prostate cancer    5. Lipid screening    6. Left elbow pain    7. Chronic pain of right knee    8. Mild obstructive sleep apnea        Plan:     1. Blood work done today.  2. Colonoscopy ordered.  3. He is going to follow-up with dermatology.  4. Continue yearly follow-ups.  5. We discussed x-ray of the right knee but he wishes to follow at this time.  Could refer to [facility] orthopedics- Dr. Geoff Epps or Dr. Elias Bill -if needed.  6. Continue current medications otherwise.  7. Follow up sooner if issues.    Orders Placed This Encounter   Procedures     Prostate Specific Antigen Screen     Lipid panel reflex to direct LDL Fasting     CBC with platelets     Basic metabolic panel     Vitamin B12     Magnesium     Colonoscopy Screening  Referral           Stanislaw Luo MD  General Internal Medicine  [facility]    Return in about 1 year (around 5/8/2024) for physical.     No future appointments.

## 2023-05-08 NOTE — PATIENT INSTRUCTIONS
Please follow up if you have any further issues.    You may contact me by phone or MyChart if you are worsening or if things are not improving.    ______________________________________________________________________     You can call [phone] during weekday hours to get a hold of our team coordinators if you need to get a message to me.    There are 3 people in our building taking phone calls for me.  Be sure to listen to the prompts to get a hold of them.    You first press 1 for English (press another number for a different language) and then press 2 to get the .    They can then take your message and forward it onto me.    ______________________________________________________________________     Please remember that you can call [phone] ANYTIME to schedule an appointment.     You can schedule appointments 24 hours a day, 7 days a week.      Sometimes the best time to schedule an appointment is after clinic hours when less people are calling in.      Weekends are another option for calling in to schedule appointments.

## 2023-05-08 NOTE — LETTER
May 15, 2023      Angelo Lino  [address] [place] [address]        Dear ,    We are writing to inform you of your test results.    Your cholesterol remains elevated at this time.  Please try to keep active as you can and lose weight where able.     Your heart risk is not high enough at this time to require cholesterol medication.     Your electrolytes were normal.  Your kidney function was good.  You were not diabetic.     I checked your magnesium level and your B12 level due to the pantoprazole and this was okay - no concerns.  The higher level of the magnesium was not of concern.     There is no signs of prostate cancer.     Your red blood cell count and white cells are doing well.     I look forward to seeing you again in 1 year, sooner if issues.    Resulted Orders   Magnesium   Result Value Ref Range    Magnesium 2.6 (H) 1.7 - 2.3 mg/dL   Vitamin B12   Result Value Ref Range    Vitamin B12 755 232 - 1,245 pg/mL   Basic metabolic panel   Result Value Ref Range    Sodium 139 136 - 145 mmol/L    Potassium 4.0 3.4 - 5.3 mmol/L    Chloride 102 98 - 107 mmol/L    Carbon Dioxide (CO2) 27 22 - 29 mmol/L    Anion Gap 10 7 - 15 mmol/L    Urea Nitrogen 21.4 (H) 6.0 - 20.0 mg/dL    Creatinine 1.09 0.67 - 1.17 mg/dL    Calcium 9.6 8.6 - 10.0 mg/dL    Glucose 103 (H) 70 - 99 mg/dL    GFR Estimate 85 >60 mL/min/1.73m2      Comment:      eGFR calculated using 2021 CKD-EPI equation.   CBC with platelets   Result Value Ref Range    WBC Count 5.3 4.0 - 11.0 10e3/uL    RBC Count 5.32 4.40 - 5.90 10e6/uL    Hemoglobin 15.0 13.3 - 17.7 g/dL    Hematocrit 44.0 40.0 - 53.0 %    MCV 83 78 - 100 fL    MCH 28.2 26.5 - 33.0 pg    MCHC 34.1 31.5 - 36.5 g/dL    RDW 12.9 10.0 - 15.0 %    Platelet Count 207 150 - 450 10e3/uL   Lipid panel reflex to direct LDL Fasting   Result Value Ref Range    Cholesterol 258 (H) <200 mg/dL    Triglycerides 109 <150 mg/dL    Direct Measure HDL 49 >=40 mg/dL    LDL Cholesterol  Calculated 187 (H) <=100 mg/dL    Non HDL Cholesterol 209 (H) <130 mg/dL    Narrative    Cholesterol  Desirable:  <200 mg/dL    Triglycerides  Normal:  Less than 150 mg/dL  Borderline High:  150-199 mg/dL  High:  200-499 mg/dL  Very High:  Greater than or equal to 500 mg/dL    Direct Measure HDL  Female:  Greater than or equal to 50 mg/dL   Male:  Greater than or equal to 40 mg/dL    LDL Cholesterol  Desirable:  <100mg/dL  Above Desirable:  100-129 mg/dL   Borderline High:  130-159 mg/dL   High:  160-189 mg/dL   Very High:  >= 190 mg/dL    Non HDL Cholesterol  Desirable:  130 mg/dL  Above Desirable:  130-159 mg/dL  Borderline High:  160-189 mg/dL  High:  190-219 mg/dL  Very High:  Greater than or equal to 220 mg/dL   Prostate Specific Antigen Screen   Result Value Ref Range    Prostate Specific Antigen Screen 0.73 0.00 - 2.50 ng/mL    Narrative    This result is obtained using the Roche Elecsys total PSA method on the aline e801 immunoassay analyzer. Results obtained with different assay methods or kits cannot be used interchangeably.       If you have any questions or concerns, please call the clinic at the number listed above.       Sincerely,      Stanislaw Luo MD

## 2023-06-17 ENCOUNTER — OFFICE VISIT (OUTPATIENT)
Dept: FAMILY MEDICINE | Facility: CLINIC | Age: 48
End: 2023-06-17
Payer: COMMERCIAL

## 2023-06-17 VITALS
WEIGHT: 243 LBS | SYSTOLIC BLOOD PRESSURE: 129 MMHG | TEMPERATURE: 97.7 F | HEART RATE: 73 BPM | DIASTOLIC BLOOD PRESSURE: 80 MMHG | BODY MASS INDEX: 27.03 KG/M2 | OXYGEN SATURATION: 98 %

## 2023-06-17 DIAGNOSIS — M25.522 LEFT ELBOW PAIN: ICD-10-CM

## 2023-06-17 DIAGNOSIS — L02.91 ABSCESS: Primary | ICD-10-CM

## 2023-06-17 PROCEDURE — 87186 SC STD MICRODIL/AGAR DIL: CPT | Performed by: FAMILY MEDICINE

## 2023-06-17 PROCEDURE — 87077 CULTURE AEROBIC IDENTIFY: CPT | Performed by: FAMILY MEDICINE

## 2023-06-17 PROCEDURE — 87070 CULTURE OTHR SPECIMN AEROBIC: CPT | Performed by: FAMILY MEDICINE

## 2023-06-17 PROCEDURE — 99213 OFFICE O/P EST LOW 20 MIN: CPT | Performed by: FAMILY MEDICINE

## 2023-06-17 PROCEDURE — 87205 SMEAR GRAM STAIN: CPT | Performed by: FAMILY MEDICINE

## 2023-06-17 RX ORDER — CEFUROXIME AXETIL 500 MG/1
500 TABLET ORAL 2 TIMES DAILY
Qty: 20 TABLET | Refills: 0 | Status: SHIPPED | OUTPATIENT
Start: 2023-06-17 | End: 2023-06-27

## 2023-06-17 NOTE — PATIENT INSTRUCTIONS
Take oral antibiotics    Warm washcloths to help drainage    Follow up as needed based on symptoms     Be seen promptly if symptoms acutely worsen

## 2023-06-17 NOTE — PROGRESS NOTES
Angelo Lino is a 47 year old male who comes in today for arm lesion for over a week    Or a week    Some tenderness    Patient thought it was a pimple    Maybe some sort of bug bit/ stung him    Lots of ticks recenetly    bandaid and neosporin used      Full physical not done     Mentation and affect are fine    No tremor of speech or extremity    On exam patient has a two cm wide red raised area with central scab type area  Tender    When we apply some pressure to this area, a bit of pus and blood and clear yellowish fluid obtained  Swab done    Not a large amount of pus despite moderately hard pressure applied    Patient also had me briefly look at left elbow    No point tenderness  Good range of motion and strength throughout   He has history of epicondylitis on other elbow    ASSESSMENT / PLAN:  (L02.91) Abscess  (primary encounter diagnosis)  Comment: this may be an infected tick or insect bite.  Unclear etiology. Prudent to cover with antibiotics.  Warm washcloths to help drainage.  To emergency room if symptoms worsen.  Swab done, pending.   Plan: cefuroxime (CEFTIN) 500 MG tablet, Abscess         Aerobic Bacterial Culture Routine with Gram         Stain             (M25.522) Left elbow pain  Comment: no epicondylitis here.  Patient will monitor symptoms.  He strains the elbow with heavy archery.    Plan: monitor.  Work on strengthening long  Term.  Follow up prn.       I reviewed the patient's medications, allergies, medical history, family history, and social history.    Darryn Wyatt MD

## 2023-06-19 LAB
BACTERIA ABSC ANAEROBE+AEROBE CULT: ABNORMAL
GRAM STAIN RESULT: ABNORMAL
GRAM STAIN RESULT: ABNORMAL

## 2023-06-26 ENCOUNTER — OFFICE VISIT (OUTPATIENT)
Dept: FAMILY MEDICINE | Facility: CLINIC | Age: 48
End: 2023-06-26
Payer: COMMERCIAL

## 2023-06-26 VITALS
SYSTOLIC BLOOD PRESSURE: 129 MMHG | HEART RATE: 63 BPM | DIASTOLIC BLOOD PRESSURE: 79 MMHG | TEMPERATURE: 98.6 F | WEIGHT: 243 LBS | RESPIRATION RATE: 16 BRPM | BODY MASS INDEX: 27.03 KG/M2 | OXYGEN SATURATION: 96 %

## 2023-06-26 DIAGNOSIS — M77.12 LATERAL EPICONDYLITIS, LEFT ELBOW: ICD-10-CM

## 2023-06-26 DIAGNOSIS — M70.22 OLECRANON BURSITIS OF LEFT ELBOW: Primary | ICD-10-CM

## 2023-06-26 PROCEDURE — 99214 OFFICE O/P EST MOD 30 MIN: CPT | Performed by: PHYSICIAN ASSISTANT

## 2023-06-26 RX ORDER — CEFUROXIME AXETIL 500 MG/1
TABLET ORAL
Qty: 8 TABLET | Refills: 0 | Status: SHIPPED | OUTPATIENT
Start: 2023-06-26 | End: 2023-07-14

## 2023-06-26 RX ORDER — IBUPROFEN 800 MG/1
800 TABLET, FILM COATED ORAL EVERY 8 HOURS PRN
Qty: 30 TABLET | Refills: 0 | Status: SHIPPED | OUTPATIENT
Start: 2023-06-26

## 2023-06-26 NOTE — PATIENT INSTRUCTIONS
(M70.22) Olecranon bursitis of left elbow  (primary encounter diagnosis)  Comment:   Plan: cefuroxime (CEFTIN) 500 MG tablet, Orthopedic          Referral, Wrist/Arm Supplies Order         Tennis Elbow Arm Band; Left, ibuprofen         (ADVIL/MOTRIN) 800 MG tablet          Referral to Ortho for both epicondylitis and olecranon bursitis.      (M77.12) Lateral epicondylitis, left elbow  Comment:   Plan: Orthopedic  Referral, Wrist/Arm         Supplies Order Tennis Elbow Arm Band; Left,         ibuprofen (ADVIL/MOTRIN) 800 MG tablet

## 2023-06-26 NOTE — PROGRESS NOTES
Patient presents with:  Elbow left: Fluid built last 2 day. Lt elbow. Constant throbbing.    (M70.22) Olecranon bursitis of left elbow  (primary encounter diagnosis)  Comment:   Plan: cefuroxime (CEFTIN) 500 MG tablet, Orthopedic          Referral, Wrist/Arm Supplies Order         Tennis Elbow Arm Band; Left, ibuprofen         (ADVIL/MOTRIN) 800 MG tablet          Referral to Ortho for both epicondylitis and olecranon bursitis.      (M77.12) Lateral epicondylitis, left elbow  Comment:   Plan: Orthopedic  Referral, Wrist/Arm         Supplies Order Tennis Elbow Arm Band; Left,         ibuprofen (ADVIL/MOTRIN) 800 MG tablet            Follow-up with orthopedics.        SUBJECTIVE:   Angelo Lino is a 47 year old male who presents today with swelling on his left elbow for the past 2 days.  It is constantly throbbing.  Denies any specific trauma.  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    He also complains of generalized aching in the left elbow for the past week or 2.  Denies any particular trauma.  He states that it feels similar to his epicondylitis that he had in his right elbow in the past.    Past Medical History:   Diagnosis Date     AR (allergic rhinitis)      Blindness of left eye      BRCA negative      GERD (gastroesophageal reflux disease)      HLD (hyperlipidemia)      Mild obstructive sleep apnea 05/02/2022    Sleep study 2021.      Nonsmoker          Current Outpatient Medications   Medication Sig Dispense Refill     Multiple Vitamins-Iron (DAILY-TERESA/IRON/BETA-CAROTENE) TABS TAKE 1 TABLET BY MOUTH DAILY. (Patient not taking: Reported on 10/19/2020) 30 tablet 7     Social History     Tobacco Use     Smoking status: Never Smoker     Smokeless tobacco: Never Used   Substance Use Topics     Alcohol use: Not on file     Family History   Problem Relation Age of Onset     Diabetes Mother      Diabetes Father          ROS:    10 point ROS of  systems including Constitutional, Eyes, Respiratory, Cardiovascular, Gastroenterology, Genitourinary, Integumentary, Muscularskeletal, Psychiatric ,neurological were all negative except for pertinent positives noted in my HPI       OBJECTIVE:  /79   Pulse 63   Temp 98.6  F (37  C) (Oral)   Resp 16   Wt 110.2 kg (243 lb)   SpO2 96%   BMI 27.03 kg/m    Physical Exam:  GENERAL APPEARANCE: healthy, alert and no distress  Extremities: no peripheral edema or tenderness, peripheral pulses normal.  Left elbow: Swelling over olecranon, no fluctuance or induration, the area is cystic feeling.  He is also tender over the lateral epicondyle.  NEURO: Normal strength and tone, sensory exam grossly normal,  normal speech and mentation  SKIN: no suspicious lesions or rashes    Procedure: Tennis elbow strap placed.

## 2023-07-14 ENCOUNTER — OFFICE VISIT (OUTPATIENT)
Dept: FAMILY MEDICINE | Facility: CLINIC | Age: 48
End: 2023-07-14
Payer: COMMERCIAL

## 2023-07-14 VITALS
OXYGEN SATURATION: 97 % | SYSTOLIC BLOOD PRESSURE: 130 MMHG | RESPIRATION RATE: 16 BRPM | TEMPERATURE: 98.1 F | HEART RATE: 71 BPM | DIASTOLIC BLOOD PRESSURE: 84 MMHG

## 2023-07-14 DIAGNOSIS — M70.22 OLECRANON BURSITIS OF LEFT ELBOW: Primary | ICD-10-CM

## 2023-07-14 PROCEDURE — 99213 OFFICE O/P EST LOW 20 MIN: CPT | Performed by: FAMILY MEDICINE

## 2023-07-14 RX ORDER — NAPROXEN 500 MG/1
500 TABLET ORAL 2 TIMES DAILY WITH MEALS
Qty: 20 TABLET | Refills: 0 | Status: SHIPPED | OUTPATIENT
Start: 2023-07-14 | End: 2023-07-24

## 2023-07-14 NOTE — PROGRESS NOTES
Assessment:       Olecranon bursitis of left elbow  - naproxen (NAPROSYN) 500 MG tablet  Dispense: 20 tablet; Refill: 0         Plan:     Patient with olecranon bursitis of the left elbow with worsening achiness within the elbow.  Does not appear to be infected.  He has recently finished a 10-day course of cefuroxime for a superficial localized infection of his skin following a tick bite.  He was on ibuprofen and symptoms seem to have gotten worse since he stopped the ibuprofen.  Prescription given for naproxen.  Follow-up if symptoms getting worse or not improving in 2 weeks.    MEDICATIONS:   Orders Placed This Encounter   Medications     naproxen (NAPROSYN) 500 MG tablet     Sig: Take 1 tablet (500 mg) by mouth 2 times daily (with meals) for 10 days     Dispense:  20 tablet     Refill:  0         Subjective:       47 year old male with recent diagnosis of olecranon bursitis who comes in today with a 1 week history of worsening discomfort within his left elbow.  He has recently been treated for tennis elbow that elbow as well.  He is also been on some antibiotics (cefuroxime) recently for a local skin reaction from a tick bite.  Since stopping the ibuprofen he has had increased pain in the area of the olecranon bursitis.  He has not had increased redness warmth or swelling of the elbow.  No fevers.    Patient Active Problem List   Diagnosis     HLD (hyperlipidemia)     GERD (gastroesophageal reflux disease)     AR (allergic rhinitis)     Blindness of left eye     Nonsmoker     Eosinophilic esophagitis     Mild obstructive sleep apnea       Past Medical History:   Diagnosis Date     AR (allergic rhinitis)      Blindness of left eye      BRCA negative      GERD (gastroesophageal reflux disease)      HLD (hyperlipidemia)      Mild obstructive sleep apnea 05/02/2022    Sleep study 2021.      Nonsmoker        History reviewed. No pertinent surgical history.    Current Outpatient Medications   Medication      pantoprazole (PROTONIX) 40 MG EC tablet     ibuprofen (ADVIL/MOTRIN) 800 MG tablet     naproxen (NAPROSYN) 500 MG tablet     No current facility-administered medications for this visit.       No Known Allergies    Family History   Problem Relation Age of Onset     Breast Cancer Mother      Genetic Disorder Mother         BRCA2     Multiple myeloma Father      Breast Cancer Sister 45       Social History     Socioeconomic History     Marital status:      Spouse name: None     Number of children: 2     Years of education: None     Highest education level: None   Tobacco Use     Smoking status: Never     Smokeless tobacco: Never   Vaping Use     Vaping Use: Never used   Substance and Sexual Activity     Alcohol use: Yes     Alcohol/week: 4.0 standard drinks of alcohol     Types: 4 Standard drinks or equivalent per week     Drug use: Never     Sexual activity: Yes     Partners: Female   Social History Narrative    , 1 daughter.         Review of Systems  Pertinent items are noted in HPI.      Objective:     /84 (BP Location: Right arm, Patient Position: Sitting, Cuff Size: Adult Regular)   Pulse 71   Temp 98.1  F (36.7  C) (Oral)   Resp 16   SpO2 97%      General appearance: alert, appears stated age and cooperative  Extremities: Patient noted to have swollen olecranon bursa of the left elbow without tenderness, redness, warmth, or other redness or warmth of the elbow.      This note has been dictated using voice recognition software. Any grammatical or context distortions are unintentional and inherent to the software

## 2023-07-31 ENCOUNTER — TRANSFERRED RECORDS (OUTPATIENT)
Dept: HEALTH INFORMATION MANAGEMENT | Facility: CLINIC | Age: 48
End: 2023-07-31
Payer: COMMERCIAL

## 2024-03-05 ENCOUNTER — TELEPHONE (OUTPATIENT)
Dept: INTERNAL MEDICINE | Facility: CLINIC | Age: 49
End: 2024-03-05
Payer: COMMERCIAL

## 2024-03-18 NOTE — TELEPHONE ENCOUNTER
Retail Pharmacy Prior Authorization Team   Phone: [phone]    PA Initiation    Medication: pantoprazole  Insurance Company: [employer] [place] - Phone [phone] Fax [phone]  Pharmacy Filling the Rx: [id number] - [place] [address] AT [facility] [address]  Filling Pharmacy Phone: [phone]  Filling Pharmacy Fax: [phone]  Start Date: 3/18/2024    MANUALLY FAXED FORM AND CHART NOTES.

## 2024-03-19 NOTE — TELEPHONE ENCOUNTER
Prior Authorization Approval    Authorization Effective Date: 2/17/2024  Authorization Expiration Date: 3/18/2025  Medication: Pantoprazole - APPROVED  Approved Dose/Quantity:    Reference #:     Insurance Company: [employer] [place] - Phone [phone] Fax [phone]  Expected CoPay:       CoPay Card Available:      Foundation Assistance Needed:    Which Pharmacy is filling the prescription (Not needed for infusion/clinic administered): [id number] - [place] [address] AT [facility] [address]  Pharmacy Notified:  YES  Patient Notified:  YES  **Instructed pharmacy to notify patient when script is ready to /ship.**

## 2024-04-08 ENCOUNTER — PATIENT OUTREACH (OUTPATIENT)
Dept: CARE COORDINATION | Facility: CLINIC | Age: 49
End: 2024-04-08
Payer: COMMERCIAL

## 2024-04-22 ENCOUNTER — PATIENT OUTREACH (OUTPATIENT)
Dept: CARE COORDINATION | Facility: CLINIC | Age: 49
End: 2024-04-22
Payer: COMMERCIAL

## 2024-05-21 DIAGNOSIS — K21.9 GASTROESOPHAGEAL REFLUX DISEASE, UNSPECIFIED WHETHER ESOPHAGITIS PRESENT: ICD-10-CM

## 2024-05-21 RX ORDER — PANTOPRAZOLE SODIUM 40 MG/1
40 TABLET, DELAYED RELEASE ORAL 2 TIMES DAILY
Qty: 180 TABLET | Refills: 0 | Status: SHIPPED | OUTPATIENT
Start: 2024-05-21 | End: 2024-08-23

## 2024-06-16 ENCOUNTER — HEALTH MAINTENANCE LETTER (OUTPATIENT)
Age: 49
End: 2024-06-16

## 2024-08-23 ENCOUNTER — TELEPHONE (OUTPATIENT)
Dept: INTERNAL MEDICINE | Facility: CLINIC | Age: 49
End: 2024-08-23
Payer: COMMERCIAL

## 2024-08-23 DIAGNOSIS — K21.9 GASTROESOPHAGEAL REFLUX DISEASE, UNSPECIFIED WHETHER ESOPHAGITIS PRESENT: ICD-10-CM

## 2024-08-23 RX ORDER — PANTOPRAZOLE SODIUM 40 MG/1
40 TABLET, DELAYED RELEASE ORAL 2 TIMES DAILY
Qty: 180 TABLET | Refills: 0 | Status: SHIPPED | OUTPATIENT
Start: 2024-08-23

## 2024-08-23 NOTE — TELEPHONE ENCOUNTER
Called and spoke to pt. Relayed msg from PCP. Pt was on the road and could not schedule at this time. He will call back when able and schedule a px.       Pt was thankful for the call.       Hari Lynch Jr., CMA on 8/23/2024 at 1:03 PM

## 2024-08-23 NOTE — TELEPHONE ENCOUNTER
Please call patient -    ______________________________________________________________________     Home phone:  [phone] (home) [phone] (work)    Cell phone:   Telephone Information:   Mobile [phone]       Other contacts:  Name Home Phone Work Phone Mobile Phone Relationship Lgl DAYRON Davison [phone]   Spouse      ______________________________________________________________________     We did a 3 month refill of the pantoprazole (Protonix) at this time.    Please help the patient to schedule an office visit or a physical in the next 2-3 months for follow up.    Stanislaw Luo MD  [facility]  8/23/2024, 9:24 AM

## 2024-12-16 SDOH — HEALTH STABILITY: PHYSICAL HEALTH: ON AVERAGE, HOW MANY DAYS PER WEEK DO YOU ENGAGE IN MODERATE TO STRENUOUS EXERCISE (LIKE A BRISK WALK)?: 2 DAYS

## 2024-12-16 SDOH — HEALTH STABILITY: PHYSICAL HEALTH: ON AVERAGE, HOW MANY MINUTES DO YOU ENGAGE IN EXERCISE AT THIS LEVEL?: 30 MIN

## 2024-12-16 ASSESSMENT — SOCIAL DETERMINANTS OF HEALTH (SDOH): HOW OFTEN DO YOU GET TOGETHER WITH FRIENDS OR RELATIVES?: ONCE A WEEK

## 2024-12-17 ENCOUNTER — OFFICE VISIT (OUTPATIENT)
Dept: INTERNAL MEDICINE | Facility: CLINIC | Age: 49
End: 2024-12-17
Payer: COMMERCIAL

## 2024-12-17 VITALS
HEART RATE: 65 BPM | TEMPERATURE: 97.8 F | BODY MASS INDEX: 29.06 KG/M2 | HEIGHT: 78 IN | RESPIRATION RATE: 16 BRPM | WEIGHT: 251.2 LBS | OXYGEN SATURATION: 96 % | DIASTOLIC BLOOD PRESSURE: 82 MMHG | SYSTOLIC BLOOD PRESSURE: 136 MMHG

## 2024-12-17 DIAGNOSIS — E78.2 MIXED HYPERLIPIDEMIA: ICD-10-CM

## 2024-12-17 DIAGNOSIS — K20.0 EOSINOPHILIC ESOPHAGITIS: ICD-10-CM

## 2024-12-17 DIAGNOSIS — R53.83 OTHER FATIGUE: ICD-10-CM

## 2024-12-17 DIAGNOSIS — R73.09 OTHER ABNORMAL GLUCOSE: ICD-10-CM

## 2024-12-17 DIAGNOSIS — Z00.00 ROUTINE PHYSICAL EXAMINATION: Primary | ICD-10-CM

## 2024-12-17 DIAGNOSIS — G47.33 MILD OBSTRUCTIVE SLEEP APNEA: ICD-10-CM

## 2024-12-17 DIAGNOSIS — K21.9 GASTROESOPHAGEAL REFLUX DISEASE, UNSPECIFIED WHETHER ESOPHAGITIS PRESENT: ICD-10-CM

## 2024-12-17 LAB
ANION GAP SERPL CALCULATED.3IONS-SCNC: 11 MMOL/L (ref 7–15)
BUN SERPL-MCNC: 18.5 MG/DL (ref 6–20)
CALCIUM SERPL-MCNC: 9.8 MG/DL (ref 8.8–10.4)
CHLORIDE SERPL-SCNC: 102 MMOL/L (ref 98–107)
CHOLEST SERPL-MCNC: 310 MG/DL
CREAT SERPL-MCNC: 1.09 MG/DL (ref 0.67–1.17)
EGFRCR SERPLBLD CKD-EPI 2021: 84 ML/MIN/1.73M2
EST. AVERAGE GLUCOSE BLD GHB EST-MCNC: 117 MG/DL
FASTING STATUS PATIENT QL REPORTED: ABNORMAL
FASTING STATUS PATIENT QL REPORTED: ABNORMAL
GLUCOSE SERPL-MCNC: 107 MG/DL (ref 70–99)
HBA1C MFR BLD: 5.7 % (ref 0–5.6)
HCO3 SERPL-SCNC: 27 MMOL/L (ref 22–29)
HDLC SERPL-MCNC: 49 MG/DL
LDLC SERPL CALC-MCNC: 236 MG/DL
NONHDLC SERPL-MCNC: 261 MG/DL
POTASSIUM SERPL-SCNC: 4 MMOL/L (ref 3.4–5.3)
SODIUM SERPL-SCNC: 140 MMOL/L (ref 135–145)
TRIGL SERPL-MCNC: 125 MG/DL

## 2024-12-17 PROCEDURE — 83036 HEMOGLOBIN GLYCOSYLATED A1C: CPT | Performed by: INTERNAL MEDICINE

## 2024-12-17 PROCEDURE — 99396 PREV VISIT EST AGE 40-64: CPT | Performed by: INTERNAL MEDICINE

## 2024-12-17 PROCEDURE — 80048 BASIC METABOLIC PNL TOTAL CA: CPT | Performed by: INTERNAL MEDICINE

## 2024-12-17 PROCEDURE — 36415 COLL VENOUS BLD VENIPUNCTURE: CPT | Performed by: INTERNAL MEDICINE

## 2024-12-17 PROCEDURE — 80061 LIPID PANEL: CPT | Performed by: INTERNAL MEDICINE

## 2024-12-17 RX ORDER — PANTOPRAZOLE SODIUM 40 MG/1
40 TABLET, DELAYED RELEASE ORAL 2 TIMES DAILY
Qty: 180 TABLET | Refills: 3 | Status: SHIPPED | OUTPATIENT
Start: 2024-12-17

## 2024-12-17 ASSESSMENT — PAIN SCALES - GENERAL: PAINLEVEL_OUTOF10: NO PAIN (0)

## 2024-12-17 NOTE — PROGRESS NOTES
[facility]    Comprehensive and complex medical care - Chronic disease management - Shared decision making - Care coordination - Compassionate care    Patient advocacy - Rational deprescribing - Minimally disruptive medicine - Ethical focus - Customized care         Date of Service: 12/17/2024  Primary Provider: Stanislaw Luo    Patient Care Team:  Stanislaw Luo MD as PCP - General (Internal Medicine)  Stanislaw Luo MD as Assigned PCP  Angie Llanes MD as MD (Internal Medicine)  Atilio Lopez MD as MD (Gastroenterology)          Patient's Pharmacy:    [id number] - [place], [place] - [address] AT [facility] [address]  [address]  [facility] [address]  Phone: [phone] Fax: [phone]     Patient's Contacts:  Name Home Phone Work Phone Mobile Phone Relationship Lgl DAYRON Davison [phone]   Spouse      Patient's Insurance:    Payor: [employer] / Plan: [facility] CHOICE / Product Type: Indemnity /            ROUTINE PHYSICAL    Active Problem List:  Problem List as of 12/17/2024 Reviewed: 12/17/2024  4:47 PM by Stanislaw Luo MD         High    Nonsmoker       Medium    GERD (gastroesophageal reflux disease)    Eosinophilic esophagitis    Mild obstructive sleep apnea       Low    Mixed hyperlipidemia    AR (allergic rhinitis)    Blindness of left eye        Past Medical History:   Diagnosis Date    AR (allergic rhinitis)     Blindness of left eye     BRCA negative     GERD (gastroesophageal reflux disease)     HLD (hyperlipidemia)     Mild obstructive sleep apnea 05/02/2022    Sleep study 2021.     Nonsmoker      No past surgical history on file.  Social History     Occupational History    Not on file   Tobacco Use    Smoking status: Never    Smokeless tobacco: Never   Vaping Use    Vaping status: Never Used   Substance and Sexual Activity    Alcohol use: Yes     Alcohol/week: 4.0 standard  drinks of alcohol     Types: 4 Standard drinks or equivalent per week    Drug use: Never    Sexual activity: Yes     Partners: Female      Social History     Social History Narrative    , 1 daughter.      Family History   Problem Relation Age of Onset    Breast Cancer Mother     Genetic Disorder Mother         BRCA2    Multiple myeloma Father     Breast Cancer Sister 45     Family history is otherwise noncontributory.    Current Outpatient Medications   Medication Instructions    pantoprazole (PROTONIX) 40 mg, Oral, 2 TIMES DAILY      Allergies: Patient has no known allergies.     Immunization History   Administered Date(s) Administered    COVID-19 Bivalent 12+ (Pfizer) 11/18/2022    COVID-19 MONOVALENT 12+ (Pfizer) 02/27/2021, 03/18/2021, 11/30/2021    DT (PEDS <7y) 01/01/2002    Flu, Unspecified 12/13/2007, 11/01/2019    Influenza (IIV3) PF 12/13/2007, 09/18/2014    Influenza Vaccine >6 months,quad, PF 12/26/2017, 12/13/2020, 11/06/2021, 11/18/2022    Influenza,INJ,MDCK,PF,Quad >6mo(Flucelvax) 11/20/2019    TD,PF 7+ (Tenivac) 05/03/2022    TDAP (Adacel,Boostrix) 06/10/2010, 09/19/2011    Td,adult,historic,unspecified 01/01/2002             Angelo Lino is a 49 year old male coming in today for:    Chief Complaint   Patient presents with    Physical          12/17/2024     7:12 AM   Additional Questions   Roomed by Edward GARCIA MA     He does not have other issues outside the physical to discuss as well.    His heartburn and his eosinophilic esophagitis are doing well at this time.  He denies any dysphagia.  He is taking pantoprazole 40 in the a.m. and 20 in the p.m.  He has tried cutting back to 40 once a day and that seems to bring back his symptoms.  He is happy with the current management plan.  We talked about other options for treatment or consultation.  He is comfortable with continuing the plan of care.    His sleep apnea is doing well and he is very happy with how the CPAP has been going for him.  He  "feels well from this standpoint.    He does have some issues with fatigue and wonders about testosterone deficiency.    We reviewed his other issues noted in the assessment but not specifically addressed in the HPI above.     Exam:     Wt Readings from Last 3 Encounters:   12/17/24 113.9 kg (251 lb 3.2 oz)   06/26/23 110.2 kg (243 lb)   06/17/23 110.2 kg (243 lb)     BP Readings from Last 3 Encounters:   12/17/24 136/82   07/14/23 130/84   06/26/23 129/79     /82 (BP Location: Right arm, Patient Position: Sitting, Cuff Size: Adult Regular)   Pulse 65   Temp 97.8  F (36.6  C) (Oral)   Resp 16   Ht 1.97 m (6' 5.56\")   Wt 113.9 kg (251 lb 3.2 oz)   SpO2 96%   BMI 29.36 kg/m     The patient is in no acute distress.  Mood good.  Insight good.  No skin lesions or nodules of concern.  Ears are clear.  Nose is clear.  Throat is clear.  Neck is supple  and there is no thyromegaly. No cervical, epitrochlear or axillary adenopathy.  Heart regular rate and rhythm.  No murmur present.  Lungs clear to auscultation bilaterally.  Respiratory effort is good.  Abdomen is soft, nontender.  No hepatosplenomegaly.  Extremities show no edema.       Diagnostics:     Office Visit on 06/17/2023   Component Date Value Ref Range Status    Culture 06/17/2023 2+ Staphylococcus aureus (A)   Final    Gram Stain Result 06/17/2023 1+ Gram positive cocci (A)   Final    Gram Stain Result 06/17/2023 4+ WBC seen (A)   Final    Predominantly PMNs        Results for orders placed or performed in visit on 12/17/24   Basic metabolic panel     Status: Abnormal   Result Value Ref Range    Sodium 140 135 - 145 mmol/L    Potassium 4.0 3.4 - 5.3 mmol/L    Chloride 102 98 - 107 mmol/L    Carbon Dioxide (CO2) 27 22 - 29 mmol/L    Anion Gap 11 7 - 15 mmol/L    Urea Nitrogen 18.5 6.0 - 20.0 mg/dL    Creatinine 1.09 0.67 - 1.17 mg/dL    GFR Estimate 84 >60 mL/min/1.73m2    Calcium 9.8 8.8 - 10.4 mg/dL    Glucose 107 (H) 70 - 99 mg/dL    Patient Fasting " > 8hrs? Unknown    Hemoglobin A1c     Status: Abnormal   Result Value Ref Range    Estimated Average Glucose 117 (H) <117 mg/dL    Hemoglobin A1C 5.7 (H) 0.0 - 5.6 %   Lipid panel reflex to direct LDL Non-fasting     Status: Abnormal   Result Value Ref Range    Cholesterol 310 (H) <200 mg/dL    Triglycerides 125 <150 mg/dL    Direct Measure HDL 49 >=40 mg/dL    LDL Cholesterol Calculated 236 (H) <100 mg/dL    Non HDL Cholesterol 261 (H) <130 mg/dL    Patient Fasting > 8hrs? Unknown     Narrative    Cholesterol  Desirable: < 200 mg/dL  Borderline High: 200 - 239 mg/dL  High: >= 240 mg/dL    Triglycerides  Normal: < 150 mg/dL  Borderline High: 150 - 199 mg/dL  High: 200-499 mg/dL  Very High: >= 500 mg/dL    Direct Measure HDL  Female: >= 50 mg/dL   Male: >= 40 mg/dL    LDL Cholesterol  Desirable: < 100 mg/dL  Above Desirable: 100 - 129 mg/dL   Borderline High: 130 - 159 mg/dL   High:  160 - 189 mg/dL   Very High: >= 190 mg/dL    Non HDL Cholesterol  Desirable: < 130 mg/dL  Above Desirable: 130 - 159 mg/dL  Borderline High: 160 - 189 mg/dL  High: 190 - 219 mg/dL  Very High: >= 220 mg/dL        Assessment:     1. Routine physical examination    2. Gastroesophageal reflux disease, unspecified whether esophagitis present    3. Mixed hyperlipidemia    4. Other abnormal glucose    5. Other fatigue    6. Eosinophilic esophagitis    7. Mild obstructive sleep apnea        Plan:     Blood work done today.  Refilled pantoprazole.  Continue current medications otherwise.  Follow up sooner if issues.    Orders Placed This Encounter   Procedures    Lipid panel reflex to direct LDL Non-fasting    Hemoglobin A1c    Basic metabolic panel    Testosterone, total           Stanislaw Luo MD  General Internal Medicine  [facility]    Return in about 1 year (around 12/17/2025) for physical, visit and blood work.     No future appointments.

## 2024-12-17 NOTE — PATIENT INSTRUCTIONS
"Please follow up if you have any further issues.    You may contact me if you are worsening or if things are not improving.    ______________________________________________________________________     How do I get a hold of Dr. Luo or his team more directly?    One option is to leave me a CENTERSON[facility]Chart message about your situation.  Another option is to call our Care Team coordinators.  These are 3 people in our [facility] taking phone calls.  They are available Monday - Friday 7 am - 6 pm.  You can call [phone] and when asked for a voice prompt, say \"CARE TEAM\" to get connected to them.  ______________________________________________________________________     What if I need an appointment with Dr. Luo specifically?    Please remember that you can call [phone] ANYTIME to schedule an appointment.  With the voice prompt, say \"APPOINTMENTS.\"    You can schedule appointments 24 hours a day, 7 days a week.      Sometimes the best time to schedule an appointment is after clinic hours or on weekends when less people are calling in.      You can also schedule appointments through [employer].    ______________________________________________________________________     What if I need care more urgently?    If it is an emergency, do not wait for us to respond as it can sometimes take awhile.  Call 911 and go to the hospital.  A number of more urgent care options are available:  Walk-in Care at [place] or [place].  Open in [place] 10 am - 8 pm Monday-Friday.  9 am - 8 pm Saturday and Sunday.  Open in [place] 10 am - 8 pm Monday-Friday.  9 am - 5 pm Saturday and Sunday.  Earlier times tend to be less crowded.  Other local urgent care.  [facility] Room (in [facility] and [place]).  For more information, [email or web address].  All locations are open from 8am to 9pm daily.  Emergency room especially in the middle of the night.  If your problem is more orthopedic in nature (joint, spine or bone pain), " "there is orthopedic urgent care as well.  [facility] urgent care.    Available locally in [facility], [place] and [place].  Open 8 am to 8 pm every day.  [facility] urgent care.  Available in [place], [place] and [place].  Open 8 am to 8 pm every day.    ______________________________________________________________________     What if I just want some advice from a nurse?    We do have triage nurses available 24-7.  Call [phone] and when asked for a voice prompt, say \"TRIAGE NURSE\"     "

## 2025-05-27 ENCOUNTER — TRANSFERRED RECORDS (OUTPATIENT)
Dept: HEALTH INFORMATION MANAGEMENT | Facility: CLINIC | Age: 50
End: 2025-05-27
Payer: COMMERCIAL